# Patient Record
Sex: FEMALE | Race: BLACK OR AFRICAN AMERICAN | NOT HISPANIC OR LATINO
[De-identification: names, ages, dates, MRNs, and addresses within clinical notes are randomized per-mention and may not be internally consistent; named-entity substitution may affect disease eponyms.]

---

## 2018-04-03 ENCOUNTER — RECORDS - HEALTHEAST (OUTPATIENT)
Dept: ADMINISTRATIVE | Facility: OTHER | Age: 68
End: 2018-04-03

## 2018-06-01 ENCOUNTER — RECORDS - HEALTHEAST (OUTPATIENT)
Dept: ADMINISTRATIVE | Facility: OTHER | Age: 68
End: 2018-06-01

## 2019-04-03 ENCOUNTER — RECORDS - HEALTHEAST (OUTPATIENT)
Dept: ADMINISTRATIVE | Facility: OTHER | Age: 69
End: 2019-04-03

## 2019-04-04 ENCOUNTER — RECORDS - HEALTHEAST (OUTPATIENT)
Dept: ADMINISTRATIVE | Facility: OTHER | Age: 69
End: 2019-04-04

## 2019-04-10 ENCOUNTER — RECORDS - HEALTHEAST (OUTPATIENT)
Dept: ADMINISTRATIVE | Facility: OTHER | Age: 69
End: 2019-04-10

## 2019-04-12 ENCOUNTER — RECORDS - HEALTHEAST (OUTPATIENT)
Dept: ADMINISTRATIVE | Facility: OTHER | Age: 69
End: 2019-04-12

## 2019-06-16 ENCOUNTER — RECORDS - HEALTHEAST (OUTPATIENT)
Dept: ADMINISTRATIVE | Facility: OTHER | Age: 69
End: 2019-06-16

## 2019-07-22 ENCOUNTER — OFFICE VISIT - HEALTHEAST (OUTPATIENT)
Dept: INTERNAL MEDICINE | Facility: CLINIC | Age: 69
End: 2019-07-22

## 2019-07-22 DIAGNOSIS — I10 BENIGN ESSENTIAL HYPERTENSION: ICD-10-CM

## 2019-07-22 DIAGNOSIS — N18.30 CHRONIC KIDNEY DISEASE, STAGE 3 (MODERATE): ICD-10-CM

## 2019-07-22 DIAGNOSIS — G40.909 SEIZURE DISORDER (H): ICD-10-CM

## 2019-07-22 DIAGNOSIS — G47.30 SLEEP APNEA, UNSPECIFIED TYPE: ICD-10-CM

## 2019-07-22 DIAGNOSIS — I25.10 ATHEROSCLEROSIS OF NATIVE CORONARY ARTERY OF NATIVE HEART WITHOUT ANGINA PECTORIS: ICD-10-CM

## 2019-07-22 DIAGNOSIS — E78.5 HYPERLIPIDEMIA, UNSPECIFIED HYPERLIPIDEMIA TYPE: ICD-10-CM

## 2019-07-22 ASSESSMENT — MIFFLIN-ST. JEOR: SCORE: 1297.3

## 2019-08-26 ENCOUNTER — OFFICE VISIT - HEALTHEAST (OUTPATIENT)
Dept: INTERNAL MEDICINE | Facility: CLINIC | Age: 69
End: 2019-08-26

## 2019-08-26 DIAGNOSIS — I10 BENIGN ESSENTIAL HYPERTENSION: ICD-10-CM

## 2019-08-26 DIAGNOSIS — E55.9 VITAMIN D DEFICIENCY, UNSPECIFIED: ICD-10-CM

## 2019-08-26 DIAGNOSIS — N18.30 CHRONIC KIDNEY DISEASE, STAGE 3 (MODERATE): ICD-10-CM

## 2019-08-26 DIAGNOSIS — G47.30 SLEEP APNEA, UNSPECIFIED TYPE: ICD-10-CM

## 2019-08-26 DIAGNOSIS — I25.10 ATHEROSCLEROSIS OF NATIVE CORONARY ARTERY OF NATIVE HEART WITHOUT ANGINA PECTORIS: ICD-10-CM

## 2019-08-26 DIAGNOSIS — R42 VERTIGO: ICD-10-CM

## 2019-08-26 DIAGNOSIS — R25.2 LEG CRAMP: ICD-10-CM

## 2019-08-26 ASSESSMENT — MIFFLIN-ST. JEOR: SCORE: 1306.37

## 2019-08-28 LAB
ATRIAL RATE - MUSE: 62 BPM
DIASTOLIC BLOOD PRESSURE - MUSE: NORMAL MMHG
INTERPRETATION ECG - MUSE: NORMAL
P AXIS - MUSE: 54 DEGREES
PR INTERVAL - MUSE: 150 MS
QRS DURATION - MUSE: 84 MS
QT - MUSE: 478 MS
QTC - MUSE: 485 MS
R AXIS - MUSE: 2 DEGREES
SYSTOLIC BLOOD PRESSURE - MUSE: NORMAL MMHG
T AXIS - MUSE: 71 DEGREES
VENTRICULAR RATE- MUSE: 62 BPM

## 2019-09-25 ENCOUNTER — RECORDS - HEALTHEAST (OUTPATIENT)
Dept: ADMINISTRATIVE | Facility: OTHER | Age: 69
End: 2019-09-25

## 2019-10-02 ENCOUNTER — COMMUNICATION - HEALTHEAST (OUTPATIENT)
Dept: INTERNAL MEDICINE | Facility: CLINIC | Age: 69
End: 2019-10-02

## 2019-10-02 ENCOUNTER — AMBULATORY - HEALTHEAST (OUTPATIENT)
Dept: CARDIOLOGY | Facility: CLINIC | Age: 69
End: 2019-10-02

## 2019-10-02 ENCOUNTER — OFFICE VISIT - HEALTHEAST (OUTPATIENT)
Dept: CARDIOLOGY | Facility: CLINIC | Age: 69
End: 2019-10-02

## 2019-10-02 ENCOUNTER — RECORDS - HEALTHEAST (OUTPATIENT)
Dept: ADMINISTRATIVE | Facility: OTHER | Age: 69
End: 2019-10-02

## 2019-10-02 DIAGNOSIS — R55 SYNCOPE: ICD-10-CM

## 2019-10-02 ASSESSMENT — MIFFLIN-ST. JEOR: SCORE: 1313.63

## 2019-10-03 LAB
ATRIAL RATE - MUSE: 66 BPM
DIASTOLIC BLOOD PRESSURE - MUSE: NORMAL
INTERPRETATION ECG - MUSE: NORMAL
P AXIS - MUSE: 40 DEGREES
PR INTERVAL - MUSE: 136 MS
QRS DURATION - MUSE: 78 MS
QT - MUSE: 468 MS
QTC - MUSE: 490 MS
R AXIS - MUSE: -17 DEGREES
SYSTOLIC BLOOD PRESSURE - MUSE: NORMAL
T AXIS - MUSE: 50 DEGREES
VENTRICULAR RATE- MUSE: 66 BPM

## 2019-10-04 ENCOUNTER — AMBULATORY - HEALTHEAST (OUTPATIENT)
Dept: CARDIOLOGY | Facility: CLINIC | Age: 69
End: 2019-10-04

## 2019-10-04 ENCOUNTER — HOME CARE/HOSPICE - HEALTHEAST (OUTPATIENT)
Dept: HOME HEALTH SERVICES | Facility: HOME HEALTH | Age: 69
End: 2019-10-04

## 2019-10-07 ENCOUNTER — COMMUNICATION - HEALTHEAST (OUTPATIENT)
Dept: INTERNAL MEDICINE | Facility: CLINIC | Age: 69
End: 2019-10-07

## 2019-10-08 ENCOUNTER — COMMUNICATION - HEALTHEAST (OUTPATIENT)
Dept: CARE COORDINATION | Facility: CLINIC | Age: 69
End: 2019-10-08

## 2019-10-09 ENCOUNTER — COMMUNICATION - HEALTHEAST (OUTPATIENT)
Dept: INTERNAL MEDICINE | Facility: CLINIC | Age: 69
End: 2019-10-09

## 2019-10-10 ENCOUNTER — COMMUNICATION - HEALTHEAST (OUTPATIENT)
Dept: CARE COORDINATION | Facility: CLINIC | Age: 69
End: 2019-10-10

## 2019-10-10 ENCOUNTER — COMMUNICATION - HEALTHEAST (OUTPATIENT)
Dept: INTERNAL MEDICINE | Facility: CLINIC | Age: 69
End: 2019-10-10

## 2019-10-10 ENCOUNTER — AMBULATORY - HEALTHEAST (OUTPATIENT)
Dept: INTERNAL MEDICINE | Facility: CLINIC | Age: 69
End: 2019-10-10

## 2019-10-10 DIAGNOSIS — I95.1 ORTHOSTATIC HYPOTENSION: ICD-10-CM

## 2019-10-10 DIAGNOSIS — R55 SYNCOPE AND COLLAPSE: ICD-10-CM

## 2019-10-15 ENCOUNTER — RECORDS - HEALTHEAST (OUTPATIENT)
Dept: ADMINISTRATIVE | Facility: OTHER | Age: 69
End: 2019-10-15

## 2019-10-15 ENCOUNTER — COMMUNICATION - HEALTHEAST (OUTPATIENT)
Dept: INTERNAL MEDICINE | Facility: CLINIC | Age: 69
End: 2019-10-15

## 2019-10-16 ENCOUNTER — RECORDS - HEALTHEAST (OUTPATIENT)
Dept: ADMINISTRATIVE | Facility: OTHER | Age: 69
End: 2019-10-16

## 2019-10-17 ENCOUNTER — AMBULATORY - HEALTHEAST (OUTPATIENT)
Dept: LAB | Facility: CLINIC | Age: 69
End: 2019-10-17

## 2019-10-17 DIAGNOSIS — I25.10 ATHEROSCLEROSIS OF NATIVE CORONARY ARTERY OF NATIVE HEART WITHOUT ANGINA PECTORIS: ICD-10-CM

## 2019-10-17 DIAGNOSIS — R25.2 LEG CRAMP: ICD-10-CM

## 2019-10-17 DIAGNOSIS — N18.30 CHRONIC KIDNEY DISEASE, STAGE 3 (MODERATE): ICD-10-CM

## 2019-10-17 DIAGNOSIS — E55.9 VITAMIN D DEFICIENCY, UNSPECIFIED: ICD-10-CM

## 2019-10-17 LAB
25(OH)D3 SERPL-MCNC: 40.1 NG/ML (ref 30–80)
ALBUMIN SERPL-MCNC: 3.9 G/DL (ref 3.5–5)
ALP SERPL-CCNC: 83 U/L (ref 45–120)
ALT SERPL W P-5'-P-CCNC: 15 U/L (ref 0–45)
ANION GAP SERPL CALCULATED.3IONS-SCNC: 7 MMOL/L (ref 5–18)
AST SERPL W P-5'-P-CCNC: 16 U/L (ref 0–40)
BILIRUB SERPL-MCNC: 0.3 MG/DL (ref 0–1)
BUN SERPL-MCNC: 13 MG/DL (ref 8–28)
CALCIUM SERPL-MCNC: 9.6 MG/DL (ref 8.5–10.5)
CHLORIDE BLD-SCNC: 110 MMOL/L (ref 98–107)
CHOLEST SERPL-MCNC: 155 MG/DL
CO2 SERPL-SCNC: 24 MMOL/L (ref 22–31)
CREAT SERPL-MCNC: 1.01 MG/DL (ref 0.6–1.1)
ERYTHROCYTE [DISTWIDTH] IN BLOOD BY AUTOMATED COUNT: 12.2 % (ref 11–14.5)
FASTING STATUS PATIENT QL REPORTED: YES
GFR SERPL CREATININE-BSD FRML MDRD: 54 ML/MIN/1.73M2
GLUCOSE BLD-MCNC: 91 MG/DL (ref 70–125)
HCT VFR BLD AUTO: 41.1 % (ref 35–47)
HDLC SERPL-MCNC: 52 MG/DL
HGB BLD-MCNC: 13.3 G/DL (ref 12–16)
LDLC SERPL CALC-MCNC: 88 MG/DL
MCH RBC QN AUTO: 32 PG (ref 27–34)
MCHC RBC AUTO-ENTMCNC: 32.4 G/DL (ref 32–36)
MCV RBC AUTO: 99 FL (ref 80–100)
PLATELET # BLD AUTO: 254 THOU/UL (ref 140–440)
PMV BLD AUTO: 11.2 FL (ref 8.5–12.5)
POTASSIUM BLD-SCNC: 4.4 MMOL/L (ref 3.5–5)
PROT SERPL-MCNC: 6.8 G/DL (ref 6–8)
RBC # BLD AUTO: 4.15 MILL/UL (ref 3.8–5.4)
SODIUM SERPL-SCNC: 141 MMOL/L (ref 136–145)
TRIGL SERPL-MCNC: 77 MG/DL
TSH SERPL DL<=0.005 MIU/L-ACNC: 1 UIU/ML (ref 0.3–5)
VIT B12 SERPL-MCNC: 238 PG/ML (ref 213–816)
WBC: 8.3 THOU/UL (ref 4–11)

## 2019-10-23 ENCOUNTER — COMMUNICATION - HEALTHEAST (OUTPATIENT)
Dept: INTERNAL MEDICINE | Facility: CLINIC | Age: 69
End: 2019-10-23

## 2019-10-25 ENCOUNTER — OFFICE VISIT - HEALTHEAST (OUTPATIENT)
Dept: INTERNAL MEDICINE | Facility: CLINIC | Age: 69
End: 2019-10-25

## 2019-10-25 ENCOUNTER — COMMUNICATION - HEALTHEAST (OUTPATIENT)
Dept: INTERNAL MEDICINE | Facility: CLINIC | Age: 69
End: 2019-10-25

## 2019-10-25 DIAGNOSIS — I10 ESSENTIAL HYPERTENSION: ICD-10-CM

## 2019-10-25 DIAGNOSIS — E55.9 VITAMIN D DEFICIENCY: ICD-10-CM

## 2019-10-25 DIAGNOSIS — G40.909 NONINTRACTABLE EPILEPSY WITHOUT STATUS EPILEPTICUS, UNSPECIFIED EPILEPSY TYPE (H): ICD-10-CM

## 2019-10-25 DIAGNOSIS — I25.119 CORONARY ARTERY DISEASE INVOLVING NATIVE CORONARY ARTERY OF NATIVE HEART WITH ANGINA PECTORIS (H): ICD-10-CM

## 2019-10-25 DIAGNOSIS — E53.8 VITAMIN B12 DEFICIENCY (NON ANEMIC): ICD-10-CM

## 2019-10-25 ASSESSMENT — MIFFLIN-ST. JEOR: SCORE: 1320.55

## 2019-10-29 ENCOUNTER — COMMUNICATION - HEALTHEAST (OUTPATIENT)
Dept: SCHEDULING | Facility: CLINIC | Age: 69
End: 2019-10-29

## 2019-10-31 ENCOUNTER — COMMUNICATION - HEALTHEAST (OUTPATIENT)
Dept: INTERNAL MEDICINE | Facility: CLINIC | Age: 69
End: 2019-10-31

## 2019-10-31 ENCOUNTER — RECORDS - HEALTHEAST (OUTPATIENT)
Dept: ADMINISTRATIVE | Facility: OTHER | Age: 69
End: 2019-10-31

## 2019-11-07 ENCOUNTER — COMMUNICATION - HEALTHEAST (OUTPATIENT)
Dept: INTERNAL MEDICINE | Facility: CLINIC | Age: 69
End: 2019-11-07

## 2019-11-07 DIAGNOSIS — G40.909 NONINTRACTABLE EPILEPSY WITHOUT STATUS EPILEPTICUS, UNSPECIFIED EPILEPSY TYPE (H): ICD-10-CM

## 2019-11-07 DIAGNOSIS — I25.119 CORONARY ARTERY DISEASE INVOLVING NATIVE CORONARY ARTERY OF NATIVE HEART WITH ANGINA PECTORIS (H): ICD-10-CM

## 2019-11-07 DIAGNOSIS — J45.20 INTERMITTENT ASTHMA WITHOUT COMPLICATION, UNSPECIFIED ASTHMA SEVERITY: ICD-10-CM

## 2019-11-07 DIAGNOSIS — E53.8 VITAMIN B12 DEFICIENCY (NON ANEMIC): ICD-10-CM

## 2019-11-07 DIAGNOSIS — E55.9 VITAMIN D DEFICIENCY: ICD-10-CM

## 2019-11-07 DIAGNOSIS — I10 ESSENTIAL HYPERTENSION: ICD-10-CM

## 2019-11-08 ENCOUNTER — RECORDS - HEALTHEAST (OUTPATIENT)
Dept: ADMINISTRATIVE | Facility: OTHER | Age: 69
End: 2019-11-08

## 2019-11-11 ENCOUNTER — COMMUNICATION - HEALTHEAST (OUTPATIENT)
Dept: INTERNAL MEDICINE | Facility: CLINIC | Age: 69
End: 2019-11-11

## 2019-11-11 DIAGNOSIS — I25.10 CORONARY ARTERY DISEASE INVOLVING NATIVE HEART WITHOUT ANGINA PECTORIS, UNSPECIFIED VESSEL OR LESION TYPE: ICD-10-CM

## 2019-11-14 ENCOUNTER — COMMUNICATION - HEALTHEAST (OUTPATIENT)
Dept: INTERNAL MEDICINE | Facility: CLINIC | Age: 69
End: 2019-11-14

## 2019-11-14 ENCOUNTER — RECORDS - HEALTHEAST (OUTPATIENT)
Dept: INTERNAL MEDICINE | Facility: CLINIC | Age: 69
End: 2019-11-14

## 2019-11-14 ENCOUNTER — RECORDS - HEALTHEAST (OUTPATIENT)
Dept: ADMINISTRATIVE | Facility: OTHER | Age: 69
End: 2019-11-14

## 2019-11-15 ENCOUNTER — OFFICE VISIT - HEALTHEAST (OUTPATIENT)
Dept: CARDIOLOGY | Facility: CLINIC | Age: 69
End: 2019-11-15

## 2019-11-15 ENCOUNTER — COMMUNICATION - HEALTHEAST (OUTPATIENT)
Dept: INTERNAL MEDICINE | Facility: CLINIC | Age: 69
End: 2019-11-15

## 2019-11-15 ENCOUNTER — COMMUNICATION - HEALTHEAST (OUTPATIENT)
Dept: CARDIOLOGY | Facility: CLINIC | Age: 69
End: 2019-11-15

## 2019-11-15 DIAGNOSIS — R42 DIZZINESS: ICD-10-CM

## 2019-11-15 DIAGNOSIS — I25.119 CORONARY ARTERY DISEASE INVOLVING NATIVE CORONARY ARTERY OF NATIVE HEART WITH ANGINA PECTORIS (H): ICD-10-CM

## 2019-11-15 DIAGNOSIS — E78.5 DYSLIPIDEMIA, GOAL LDL BELOW 70: ICD-10-CM

## 2019-11-15 DIAGNOSIS — I10 ESSENTIAL HYPERTENSION: ICD-10-CM

## 2019-11-15 ASSESSMENT — MIFFLIN-ST. JEOR: SCORE: 1324.52

## 2019-11-18 ENCOUNTER — HOSPITAL ENCOUNTER (OUTPATIENT)
Dept: CARDIOLOGY | Facility: CLINIC | Age: 69
Discharge: HOME | End: 2019-11-18
Attending: INTERNAL MEDICINE

## 2019-11-18 ENCOUNTER — OFFICE VISIT - HEALTHEAST (OUTPATIENT)
Dept: INTERNAL MEDICINE | Facility: CLINIC | Age: 69
End: 2019-11-18

## 2019-11-18 DIAGNOSIS — G47.30 SLEEP APNEA, UNSPECIFIED TYPE: ICD-10-CM

## 2019-11-18 DIAGNOSIS — I10 ESSENTIAL HYPERTENSION: ICD-10-CM

## 2019-11-18 DIAGNOSIS — E78.5 DYSLIPIDEMIA, GOAL LDL BELOW 70: ICD-10-CM

## 2019-11-18 DIAGNOSIS — G40.909 NONINTRACTABLE EPILEPSY WITHOUT STATUS EPILEPTICUS, UNSPECIFIED EPILEPSY TYPE (H): ICD-10-CM

## 2019-11-18 DIAGNOSIS — R42 DIZZINESS: ICD-10-CM

## 2019-11-18 ASSESSMENT — MIFFLIN-ST. JEOR: SCORE: 1347.2

## 2019-11-19 ENCOUNTER — COMMUNICATION - HEALTHEAST (OUTPATIENT)
Dept: CARDIOLOGY | Facility: CLINIC | Age: 69
End: 2019-11-19

## 2019-11-21 ENCOUNTER — COMMUNICATION - HEALTHEAST (OUTPATIENT)
Dept: CARDIOLOGY | Facility: CLINIC | Age: 69
End: 2019-11-21

## 2019-11-21 ENCOUNTER — COMMUNICATION - HEALTHEAST (OUTPATIENT)
Dept: INTERNAL MEDICINE | Facility: CLINIC | Age: 69
End: 2019-11-21

## 2019-11-21 DIAGNOSIS — I25.10 CORONARY ARTERY DISEASE INVOLVING NATIVE HEART WITHOUT ANGINA PECTORIS, UNSPECIFIED VESSEL OR LESION TYPE: ICD-10-CM

## 2019-11-26 ENCOUNTER — COMMUNICATION - HEALTHEAST (OUTPATIENT)
Dept: CARDIOLOGY | Facility: CLINIC | Age: 69
End: 2019-11-26

## 2019-11-29 ENCOUNTER — COMMUNICATION - HEALTHEAST (OUTPATIENT)
Dept: CARDIOLOGY | Facility: CLINIC | Age: 69
End: 2019-11-29

## 2019-12-03 ENCOUNTER — COMMUNICATION - HEALTHEAST (OUTPATIENT)
Dept: CARDIOLOGY | Facility: CLINIC | Age: 69
End: 2019-12-03

## 2019-12-04 ENCOUNTER — RECORDS - HEALTHEAST (OUTPATIENT)
Dept: ADMINISTRATIVE | Facility: OTHER | Age: 69
End: 2019-12-04

## 2019-12-12 ENCOUNTER — COMMUNICATION - HEALTHEAST (OUTPATIENT)
Dept: INTERNAL MEDICINE | Facility: CLINIC | Age: 69
End: 2019-12-12

## 2020-01-13 ENCOUNTER — OFFICE VISIT - HEALTHEAST (OUTPATIENT)
Dept: INTERNAL MEDICINE | Facility: CLINIC | Age: 70
End: 2020-01-13

## 2020-01-13 DIAGNOSIS — R42 DIZZINESS: ICD-10-CM

## 2020-01-13 DIAGNOSIS — I10 ESSENTIAL HYPERTENSION: ICD-10-CM

## 2020-01-13 DIAGNOSIS — Z12.11 SCREEN FOR COLON CANCER: ICD-10-CM

## 2020-01-13 DIAGNOSIS — G47.33 OBSTRUCTIVE SLEEP APNEA SYNDROME: ICD-10-CM

## 2020-01-13 ASSESSMENT — MIFFLIN-ST. JEOR: SCORE: 1333.59

## 2020-01-20 ENCOUNTER — HOSPITAL ENCOUNTER (OUTPATIENT)
Dept: NEUROLOGY | Facility: HOSPITAL | Age: 70
Discharge: HOME | End: 2020-01-20
Attending: PSYCHIATRY & NEUROLOGY

## 2020-01-20 DIAGNOSIS — G40.209 COMPLEX PARTIAL SEIZURE (H): ICD-10-CM

## 2020-01-20 DIAGNOSIS — R42 VERTIGO: ICD-10-CM

## 2020-01-30 ENCOUNTER — COMMUNICATION - HEALTHEAST (OUTPATIENT)
Dept: SCHEDULING | Facility: CLINIC | Age: 70
End: 2020-01-30

## 2020-02-06 ENCOUNTER — OFFICE VISIT - HEALTHEAST (OUTPATIENT)
Dept: INTERNAL MEDICINE | Facility: CLINIC | Age: 70
End: 2020-02-06

## 2020-02-06 DIAGNOSIS — I10 ESSENTIAL HYPERTENSION: ICD-10-CM

## 2020-02-06 DIAGNOSIS — E53.8 VITAMIN B12 DEFICIENCY (NON ANEMIC): ICD-10-CM

## 2020-02-06 DIAGNOSIS — E55.9 VITAMIN D DEFICIENCY: ICD-10-CM

## 2020-02-06 DIAGNOSIS — R25.2 MUSCLE CRAMP: ICD-10-CM

## 2020-02-06 LAB
ALBUMIN SERPL-MCNC: 4.1 G/DL (ref 3.5–5)
ALP SERPL-CCNC: 78 U/L (ref 45–120)
ALT SERPL W P-5'-P-CCNC: 18 U/L (ref 0–45)
ANION GAP SERPL CALCULATED.3IONS-SCNC: 13 MMOL/L (ref 5–18)
AST SERPL W P-5'-P-CCNC: 18 U/L (ref 0–40)
BILIRUB SERPL-MCNC: 0.5 MG/DL (ref 0–1)
BUN SERPL-MCNC: 24 MG/DL (ref 8–28)
CALCIUM SERPL-MCNC: 9.5 MG/DL (ref 8.5–10.5)
CHLORIDE BLD-SCNC: 104 MMOL/L (ref 98–107)
CK SERPL-CCNC: 159 U/L (ref 30–190)
CO2 SERPL-SCNC: 23 MMOL/L (ref 22–31)
CREAT SERPL-MCNC: 1.42 MG/DL (ref 0.6–1.1)
ERYTHROCYTE [DISTWIDTH] IN BLOOD BY AUTOMATED COUNT: 10.5 % (ref 11–14.5)
ERYTHROCYTE [SEDIMENTATION RATE] IN BLOOD BY WESTERGREN METHOD: 12 MM/HR (ref 0–20)
GFR SERPL CREATININE-BSD FRML MDRD: 36 ML/MIN/1.73M2
GLUCOSE BLD-MCNC: 166 MG/DL (ref 70–125)
HCT VFR BLD AUTO: 40.9 % (ref 35–47)
HGB BLD-MCNC: 13.9 G/DL (ref 12–16)
MCH RBC QN AUTO: 33 PG (ref 27–34)
MCHC RBC AUTO-ENTMCNC: 34 G/DL (ref 32–36)
MCV RBC AUTO: 97 FL (ref 80–100)
PLATELET # BLD AUTO: 271 THOU/UL (ref 140–440)
PMV BLD AUTO: 9.1 FL (ref 7–10)
POTASSIUM BLD-SCNC: 3.8 MMOL/L (ref 3.5–5)
PROT SERPL-MCNC: 7.3 G/DL (ref 6–8)
RBC # BLD AUTO: 4.21 MILL/UL (ref 3.8–5.4)
SODIUM SERPL-SCNC: 140 MMOL/L (ref 136–145)
VIT B12 SERPL-MCNC: 278 PG/ML (ref 213–816)
WBC: 9.5 THOU/UL (ref 4–11)

## 2020-02-06 ASSESSMENT — MIFFLIN-ST. JEOR: SCORE: 1329.42

## 2020-02-07 ENCOUNTER — COMMUNICATION - HEALTHEAST (OUTPATIENT)
Dept: INTERNAL MEDICINE | Facility: CLINIC | Age: 70
End: 2020-02-07

## 2020-02-07 LAB — 25(OH)D3 SERPL-MCNC: 43.2 NG/ML (ref 30–80)

## 2020-04-17 ENCOUNTER — OFFICE VISIT - HEALTHEAST (OUTPATIENT)
Dept: INTERNAL MEDICINE | Facility: CLINIC | Age: 70
End: 2020-04-17

## 2020-04-17 DIAGNOSIS — L98.9 SKIN LESION: ICD-10-CM

## 2020-04-28 ENCOUNTER — OFFICE VISIT - HEALTHEAST (OUTPATIENT)
Dept: CARDIOLOGY | Facility: CLINIC | Age: 70
End: 2020-04-28

## 2020-04-28 DIAGNOSIS — G47.33 OBSTRUCTIVE SLEEP APNEA SYNDROME: ICD-10-CM

## 2020-04-28 DIAGNOSIS — E78.5 DYSLIPIDEMIA, GOAL LDL BELOW 70: ICD-10-CM

## 2020-04-28 DIAGNOSIS — I10 ESSENTIAL HYPERTENSION: ICD-10-CM

## 2020-04-28 DIAGNOSIS — I95.1 ORTHOSTATIC HYPOTENSION: ICD-10-CM

## 2020-04-28 DIAGNOSIS — I25.119 CORONARY ARTERY DISEASE INVOLVING NATIVE CORONARY ARTERY OF NATIVE HEART WITH ANGINA PECTORIS (H): ICD-10-CM

## 2020-10-07 ENCOUNTER — COMMUNICATION - HEALTHEAST (OUTPATIENT)
Dept: SCHEDULING | Facility: CLINIC | Age: 70
End: 2020-10-07

## 2020-10-08 ENCOUNTER — OFFICE VISIT - HEALTHEAST (OUTPATIENT)
Dept: INTERNAL MEDICINE | Facility: CLINIC | Age: 70
End: 2020-10-08

## 2020-10-08 DIAGNOSIS — G47.33 OBSTRUCTIVE SLEEP APNEA SYNDROME: ICD-10-CM

## 2020-10-08 DIAGNOSIS — M54.50 ACUTE MIDLINE LOW BACK PAIN WITHOUT SCIATICA: ICD-10-CM

## 2020-10-08 ASSESSMENT — MIFFLIN-ST. JEOR: SCORE: 1279.16

## 2020-11-17 ENCOUNTER — OFFICE VISIT - HEALTHEAST (OUTPATIENT)
Dept: INTERNAL MEDICINE | Facility: CLINIC | Age: 70
End: 2020-11-17

## 2020-11-17 DIAGNOSIS — I25.119 CORONARY ARTERY DISEASE INVOLVING NATIVE CORONARY ARTERY OF NATIVE HEART WITH ANGINA PECTORIS (H): ICD-10-CM

## 2020-11-17 DIAGNOSIS — Z23 NEEDS FLU SHOT: ICD-10-CM

## 2020-11-17 DIAGNOSIS — Z11.59 NEED FOR HEPATITIS C SCREENING TEST: ICD-10-CM

## 2020-11-17 DIAGNOSIS — E53.8 VITAMIN B12 DEFICIENCY (NON ANEMIC): ICD-10-CM

## 2020-11-17 DIAGNOSIS — Z12.11 SCREEN FOR COLON CANCER: ICD-10-CM

## 2020-11-17 DIAGNOSIS — I10 ESSENTIAL HYPERTENSION: ICD-10-CM

## 2020-11-17 DIAGNOSIS — E55.9 VITAMIN D DEFICIENCY: ICD-10-CM

## 2020-11-17 DIAGNOSIS — Z00.00 WELLNESS EXAMINATION: ICD-10-CM

## 2020-11-17 LAB
ALBUMIN SERPL-MCNC: 4.3 G/DL (ref 3.5–5)
ALP SERPL-CCNC: 91 U/L (ref 45–120)
ALT SERPL W P-5'-P-CCNC: 12 U/L (ref 0–45)
ANION GAP SERPL CALCULATED.3IONS-SCNC: 13 MMOL/L (ref 5–18)
AST SERPL W P-5'-P-CCNC: 16 U/L (ref 0–40)
BILIRUB SERPL-MCNC: 0.6 MG/DL (ref 0–1)
BUN SERPL-MCNC: 16 MG/DL (ref 8–28)
CALCIUM SERPL-MCNC: 10.2 MG/DL (ref 8.5–10.5)
CHLORIDE BLD-SCNC: 106 MMOL/L (ref 98–107)
CHOLEST SERPL-MCNC: 158 MG/DL
CO2 SERPL-SCNC: 24 MMOL/L (ref 22–31)
CREAT SERPL-MCNC: 1.38 MG/DL (ref 0.6–1.1)
ERYTHROCYTE [DISTWIDTH] IN BLOOD BY AUTOMATED COUNT: 11.2 % (ref 11–14.5)
FASTING STATUS PATIENT QL REPORTED: YES
GFR SERPL CREATININE-BSD FRML MDRD: 38 ML/MIN/1.73M2
GLUCOSE BLD-MCNC: 109 MG/DL (ref 70–125)
HCT VFR BLD AUTO: 42.9 % (ref 35–47)
HDLC SERPL-MCNC: 45 MG/DL
HGB BLD-MCNC: 13.9 G/DL (ref 12–16)
LDLC SERPL CALC-MCNC: 95 MG/DL
MCH RBC QN AUTO: 31.1 PG (ref 27–34)
MCHC RBC AUTO-ENTMCNC: 32.4 G/DL (ref 32–36)
MCV RBC AUTO: 96 FL (ref 80–100)
PLATELET # BLD AUTO: 297 THOU/UL (ref 140–440)
PMV BLD AUTO: 8.7 FL (ref 7–10)
POTASSIUM BLD-SCNC: 3.7 MMOL/L (ref 3.5–5)
PROT SERPL-MCNC: 7 G/DL (ref 6–8)
RBC # BLD AUTO: 4.46 MILL/UL (ref 3.8–5.4)
SODIUM SERPL-SCNC: 143 MMOL/L (ref 136–145)
TRIGL SERPL-MCNC: 90 MG/DL
TSH SERPL DL<=0.005 MIU/L-ACNC: 0.95 UIU/ML (ref 0.3–5)
VIT B12 SERPL-MCNC: 347 PG/ML (ref 213–816)
WBC: 8.1 THOU/UL (ref 4–11)

## 2020-11-17 ASSESSMENT — MIFFLIN-ST. JEOR: SCORE: 1198.08

## 2020-11-18 ENCOUNTER — COMMUNICATION - HEALTHEAST (OUTPATIENT)
Dept: INTERNAL MEDICINE | Facility: CLINIC | Age: 70
End: 2020-11-18

## 2020-11-18 ENCOUNTER — COMMUNICATION - HEALTHEAST (OUTPATIENT)
Dept: CARDIOLOGY | Facility: CLINIC | Age: 70
End: 2020-11-18

## 2020-11-18 LAB
25(OH)D3 SERPL-MCNC: 69.8 NG/ML (ref 30–80)
HCV AB SERPL QL IA: NEGATIVE

## 2020-11-19 ENCOUNTER — OFFICE VISIT - HEALTHEAST (OUTPATIENT)
Dept: CARDIOLOGY | Facility: CLINIC | Age: 70
End: 2020-11-19

## 2020-11-19 DIAGNOSIS — I25.119 CORONARY ARTERY DISEASE INVOLVING NATIVE CORONARY ARTERY OF NATIVE HEART WITH ANGINA PECTORIS (H): ICD-10-CM

## 2020-11-19 DIAGNOSIS — E78.5 DYSLIPIDEMIA, GOAL LDL BELOW 70: ICD-10-CM

## 2020-11-19 DIAGNOSIS — I95.1 ORTHOSTATIC HYPOTENSION: ICD-10-CM

## 2020-11-19 DIAGNOSIS — I10 ESSENTIAL HYPERTENSION: ICD-10-CM

## 2020-11-30 ENCOUNTER — COMMUNICATION - HEALTHEAST (OUTPATIENT)
Dept: CARDIOLOGY | Facility: CLINIC | Age: 70
End: 2020-11-30

## 2020-12-10 ENCOUNTER — COMMUNICATION - HEALTHEAST (OUTPATIENT)
Dept: CARDIOLOGY | Facility: CLINIC | Age: 70
End: 2020-12-10

## 2021-02-15 ENCOUNTER — COMMUNICATION - HEALTHEAST (OUTPATIENT)
Dept: SCHEDULING | Facility: CLINIC | Age: 71
End: 2021-02-15

## 2021-02-15 DIAGNOSIS — I25.10 CORONARY ARTERY DISEASE INVOLVING NATIVE HEART WITHOUT ANGINA PECTORIS, UNSPECIFIED VESSEL OR LESION TYPE: ICD-10-CM

## 2021-02-19 ENCOUNTER — COMMUNICATION - HEALTHEAST (OUTPATIENT)
Dept: SCHEDULING | Facility: CLINIC | Age: 71
End: 2021-02-19

## 2021-02-19 ENCOUNTER — COMMUNICATION - HEALTHEAST (OUTPATIENT)
Dept: INTERNAL MEDICINE | Facility: CLINIC | Age: 71
End: 2021-02-19

## 2021-05-27 VITALS — DIASTOLIC BLOOD PRESSURE: 59 MMHG | SYSTOLIC BLOOD PRESSURE: 128 MMHG

## 2021-05-28 ASSESSMENT — ASTHMA QUESTIONNAIRES
ACT_TOTALSCORE: 17
ACT_TOTALSCORE: 21
ACT_TOTALSCORE: 15
ACT_TOTALSCORE: 15

## 2021-05-30 NOTE — PROGRESS NOTES
Office Visit   Shira Omalley   68 y.o. female    Date of Visit: 7/22/2019    Chief Complaint   Patient presents with     [facility] visit     Vertigo and falling        Assessment and Plan   1. Atherosclerosis of native coronary artery of native heart without angina pectoris  She is not real clear on her medications but does believe she is taking Plavix and aspirin.  States she is only on amlodipine.  She was recently evaluated in [place] and just moved here.  I think it would be beneficial to get her outside records before doing anything else.  We will have her come back and see me in a few weeks so that we can evaluate her outside records and determine next steps.  She is in agreement with this plan.  - clopidogrel (PLAVIX) 75 mg tablet; Take 1 tablet (75 mg total) by mouth daily.  Dispense: 90 tablet; Refill: 3  - aspirin 81 MG EC tablet; Take 1 tablet (81 mg total) by mouth daily.  Dispense: 90 tablet; Refill: 0    2. Benign essential hypertension  Her blood pressure is satisfactory today.  Again she thinks that she is just taking amlodipine.    3. Chronic kidney disease, stage 3 (moderate) (H)  We will get her old records to determine next steps.    4. Hyperlipidemia, unspecified hyperlipidemia type  She is not taking her atorvastatin at this time.    5. Sleep apnea, unspecified type  Right now she does not have her sleep apnea machine.  May need to get her in with the sleep clinic.  We will see her back to reassess.       Return in about 4 weeks (around 8/19/2019) for Recheck - multiple med problems - 40 minutes.     History of Present Illness   This 68 y.o. old female comes in to [facility].  She recently moved from [facility] to [place].  States she lived here until 2009.  She has a complicated medical history with a history of seizure disorder, coronary artery disease, hypertension, kidney disease, breast cancer, uterine cancer, and possibly ovarian cancer.  I do not have her  "outside records but she was recently seen by her primary in [place].  Her only major concern is that she has ongoing vertigo but did recently have a significant evaluation.  Would like to get those records before doing any more testing.    Review of Systems: As above, systems otherwise reviewed and negative.     Medications, Allergies and Problem List   Patient Active Problem List   Diagnosis     Atherosclerotic heart disease of native coronary artery without angina pectoris     Chronic kidney disease, stage 3 (moderate) (H)     Hypercalcemia     Hyperlipidemia, unspecified     Hyperparathyroidism (H)     Personal history of other malignant neoplasm of bronchus and lung     Sleep apnea     Benign essential hypertension     HX: breast cancer     Cancer, uterine (H)     Seizure disorder (H)     Current Outpatient Medications   Medication Sig Dispense Refill     amLODIPine (NORVASC) 10 MG tablet        aspirin 81 MG EC tablet Take 1 tablet (81 mg total) by mouth daily. 90 tablet 0     nitroglycerin (NITROSTAT) 0.4 MG SL tablet        clopidogrel (PLAVIX) 75 mg tablet Take 1 tablet (75 mg total) by mouth daily. 90 tablet 3     No current facility-administered medications for this visit.      Allergies   Allergen Reactions     Contrast [Iohexol] Itching     Iodinated Casein      Iodinated Contrast- Oral And Iv Dye Itching     Premedicated prior to CT scans and has no problems     Penicillins           Physical Exam     /90   Pulse 80   Ht 5' 4.25\" (1.632 m)   Wt 176 lb (79.8 kg)   SpO2 98%   BMI 29.98 kg/m      General: This is an alert female in no apparent distress.     Additional Information   Social History     Tobacco Use     Smoking status: Former Smoker     Smokeless tobacco: Never Used   Substance Use Topics     Alcohol use: Not on file     Drug use: Not on file          Nguyen Jordan MD    "

## 2021-05-31 NOTE — PROGRESS NOTES
Office Visit   Shira Omalley   68 y.o. female    Date of Visit: 8/26/2019    Chief Complaint   Patient presents with     Follow-up     4 week follow up- vertigo        Assessment and Plan   1. Atherosclerosis of native coronary artery of native heart without angina pectoris  I do have some records from her cardiologist in [place] but unfortunately do not have any notes from her hospitalization in May.  She states that at that time they took her off pretty much all of her medications.  They only left her on amlodipine and clopidogrel.  Previously she had been on losartan, atorvastatin, and carvedilol but is not currently taking any of these medications.  She states she does occasionally have chest pain and will take a nitroglycerin.  I am going to refer her to cardiology, set up a recheck of her labs, get an EKG, and try again to get her records from the hospitalization in May when most of her medications were discontinued.  She is in agreement with this plan.  - clopidogrel (PLAVIX) 75 mg tablet; Take 1 tablet (75 mg total) by mouth daily.  Dispense: 90 tablet; Refill: 3  - amLODIPine (NORVASC) 10 MG tablet; Take 1 tablet (10 mg total) by mouth daily.  Dispense: 90 tablet; Refill: 3  - Lipid Cascade; Future  - Electrocardiogram Perform - Clinic; Future  - Ambulatory referral to Cardiology  - Electrocardiogram Perform and Read    2. Chronic kidney disease, stage 3 (moderate) (H)  She notes a history of kidney disease.  She was seeing a nephrologist in [place].  Will get a recheck of her labs and refer her to nephrology.  - HM2(CBC w/o Differential); Future  - Comprehensive Metabolic Panel; Future  - Vitamin D, Total (25-Hydroxy); Future  - Ambulatory referral to Nephrology    3. Vertigo  She has been having significant symptoms of vertigo.  She also notes some symptoms of visual hallucination or distortion.  She thinks that she had some sort of brain imaging in May and I will again try to get those  records.  If the symptoms continue then we may need to get an MRI but it seems that she is probably already had one.  I think that that would be very helpful.    4. Benign essential hypertension  Blood pressure is satisfactory on the amlodipine.    5. Leg cramp  She notes increased symptoms of leg cramping.  We will get her electrolytes and thyroid function as well as B12.  She also notes she has been low in vitamin D and we will recheck that as well.  - Vitamin D, Total (25-Hydroxy); Future  - Vitamin B12; Future  - Thyroid Cascade; Future    6. Vitamin D deficiency, unspecified  - Vitamin D, Total (25-Hydroxy); Future    7. Sleep apnea, unspecified type  She has a history of sleep apnea but is not on a CPAP at this time.  She like to have this reevaluated.  - Ambulatory referral to Sleep Medicine       Return in about 1 month (around 9/26/2019) for Annual physical.     History of Present Illness   This 68 y.o. old comes in to follow-up.  I saw her for the first time about a month ago and she was unclear about her medications.  Since then we have received some of her outside records.  I have notes from her primary physician as well as her cardiologist in [facility].  I have recent blood work as well as her most recent echocardiogram which was in 2017.  Her last clinic visit with her cardiologist in March 2019 states that she was stable.  She notes however that she was hospitalized in May due to significant dizziness and falls.  During that hospitalization she states that many of her medications were discontinued including carvedilol, losartan, and atorvastatin.  She states that she now is only on clopidogrel and amlodipine.  Unfortunately I do not have the records from that hospitalizations.  She states that she continues to have vertigo symptoms.  She is off balance.  Her  on her left hand is not as strong as its been.  She has visual symptoms that almost seem like hallucination at times.  She feels  "that she did have an MRI or head CT when she was in the hospital.  Her symptoms have not worsened but discontinue.  She also notes that on occasion she will get chest discomfort.  It does not last long.  Is not related to exertion.  She will take nitroglycerin.  She has no other acute concerns today.    Review of Systems: As above, systems otherwise reviewed and negative.     Medications, Allergies and Problem List   Patient Active Problem List   Diagnosis     Atherosclerotic heart disease of native coronary artery without angina pectoris     Chronic kidney disease, stage 3 (moderate) (H)     Hypercalcemia     Hyperlipidemia, unspecified     Hyperparathyroidism (H)     Personal history of other malignant neoplasm of bronchus and lung     Sleep apnea     Benign essential hypertension     HX: breast cancer     Cancer, uterine (H)     Seizure disorder (H)     History of colonic polyps     Osteopenia of multiple sites     Vertigo     Current Outpatient Medications   Medication Sig Dispense Refill     amLODIPine (NORVASC) 10 MG tablet Take 1 tablet (10 mg total) by mouth daily. 90 tablet 3     aspirin 81 MG EC tablet Take 1 tablet (81 mg total) by mouth daily. 90 tablet 0     clopidogrel (PLAVIX) 75 mg tablet Take 1 tablet (75 mg total) by mouth daily. 90 tablet 3     nitroglycerin (NITROSTAT) 0.4 MG SL tablet        No current facility-administered medications for this visit.      Allergies   Allergen Reactions     Contrast [Iohexol] Itching     Iodinated Casein      Iodinated Contrast- Oral And Iv Dye Itching     Premedicated prior to CT scans and has no problems     Penicillins           Physical Exam     /88 (Patient Site: Right Arm, Patient Position: Sitting)   Pulse 66   Ht 5' 4.25\" (1.632 m)   Wt 178 lb (80.7 kg)   SpO2 100%   BMI 30.32 kg/m      General: This is an alert female in no apparent distress.     Additional Information   Social History     Tobacco Use     Smoking status: Former Smoker     " Smokeless tobacco: Never Used   Substance Use Topics     Alcohol use: Not on file     Drug use: Not on file            Nguyen Jordan MD

## 2021-06-01 NOTE — PROGRESS NOTES
Patient seen in heart care clinic by MDG. Verbal order to have patient directly admitted for syncope. Bed availability tight throughout . Spoke with nursing supervisor, bed possibly available around 1 hour. Updated Dr. Schumacher, and given verbal order that patient may wait in the lobby/in front of desk til bed available. Informed patient that bed will be available around 1500. Pt may wait in lobby area until bed available. Pt agreeable.     Pt ambulated to [facility] with SBA from [facility]. Walking strong at first, and then becoming slightly unsteady. Seated in chair in front of scheduling desk. Writer sat with patient and talked for approx 35 minutes. Pt informed to alert desk/scheduling staff if not feeling well. Informed writer that she feels fine and will just read. Writer returned to her office, bed available at 1450. Checked on patient and visualized patient seated in same chair in [facility], reading her book. Called verbal report to accepting RN on 4000. At approximately 1515, [facility] took patient via wheelchair to room 4148, and turned over patient care to LUKE Mckinney.

## 2021-06-01 NOTE — TELEPHONE ENCOUNTER
FYI - Status Update  Who is Calling: Patient  Update: I am calling to inform Nguyen Jordan MD I am being admitted to [facility].      Okay to leave a detailed message?:  Yes

## 2021-06-01 NOTE — PROGRESS NOTES
[facility] Heart Care Office Consult     Assessment:   1.  Patient reports long-standing history of what sounds like orthostatic hypotension although details are somewhat challenging.  She states that she is had this difficulty since childhood but has become profoundly worse.  She reports that she spent 13 days in the hospital in [facility] but we do not yet have the records.  Her blood pressure today is elevated but does drop approximately 50 mmHg upon standing.  Currently she is on amlodipine.  She reports that she has had not come out of her house for a few months except for doctor's visits because of her concern of passing out and injury.  She gives a number of examples of when she feels woozy and that she might pass out.  She states that she knows she is supposed to sit down or lie down but oftentimes tries to finish the activity.  She indicates that she can only walk one half block without having to stop because of getting dizzy or lightheaded.  I suspect that this may be orthostatic hypotension will need to further discern neurologic contribution.  Patient mentions a history of seizure and mentions phenobarbital but these details are limited.  Given the profound nature of her symptoms and the progressive nature of her symptoms I have recommended that we admit her to hospital for observation on telemetry, echocardiogram, neurologic consultation and further recommendations pending additional observation and requested information from the outside hospital.  I am concerned that an echocardiogram from a few years ago suggested at least moderate pulmonary hypertension and whether this is contributing to her symptomatology needs further investigation.    2.  Coronary artery disease history by report of stenting apparently in 2009 without clear-cut symptoms of angina.  Further work-up and recommendations pending the outcome of the additional plan hospitalization.  For uncertain reasons it appears that the  patient was taken off atorvastatin during a hospitalization May 2019.  1. Syncope  ECG Clinic - Today      Plan:   1.  Admit to hospital for additional evaluation of profound episodic orthostatic hypotension and syncope.  2.  Echocardiogram  3.  Telemetry monitoring  4.  Would favor neurologic consultation given concern about possible history of seizure disorder  Further recommendations pending the outcome of the planned tests and admission to hospital.        History of Present Illness:   Thank you for asking the [facility] Heart Care team to see Shira Omalley a 68 y.o.  female  in consultation  to evaluate symptoms of dizziness and syncope that sounds primarily orthostatic.  She gives a very difficult history to sort out.  Unfortunately we have received only some of the history from [place] and have requested some additional information.  She tells me she has a long-standing history dating back to childhood of near syncope or syncope.  She states that upon standing she often feels dizzy and lightheaded.  She tells me that she was hospitalized May 2019 in [facility] for a total of 13 days.  Unfortunately it is very difficult to sort out from her history what took place.  She tells me however that the symptoms of near syncope and syncope are getting worse.  She also describes a possible seizure disorder.  She states that she cannot walk more than a half a block without becoming profoundly dizzy and needing to sit down.  She states that she has basically been homebound because of her fear of syncope.  She has not had significant chest discomfort recently but does report that she feels a pounding sensation in her chest when she tries to sleep at night and therefore is not able to sleep and indicates that she is very tired.    Reviewing some of the records that we have received that she has a history of coronary artery disease with stenting to the LAD, history of hypertension,  history of lung cancer with prior treatment with CyberKnife, history of uterine cancer with hysterectomy, history of breast cancer with apparent chemotherapy.  History of parathyroid surgery.  Note from a cardiologist in [place] Dr. Diaz May 2018 indicated that she had been describing some dizziness and the suggestion at that time was to see a neurologist although I am not certain whether she did see neurology.  A Holter monitor was reported at that time.  Test results which I do have included an echocardiogram from May 2017 with left ventricular function was said to be normal but she did have evidence for pulmonary hypertension with right ventricular systolic pressure estimated 61 mmHg which was felt to be moderately elevated.  There is a carotid ultrasound from 2018 which was negative for significant stenosis and a nuclear stress test from 2017 that was negative for perfusion abnormality.    Cardiac risk factors include remote history of tobacco use, positive for hypertension, negative for known diabetes, patient states that multiple medications were discontinued when she was in hospital May 2019 including atorvastatin, antihypertensive medications except amlodipine and Plavix.    Past Medical History:     Past Medical History:   Diagnosis Date     CAD (coronary artery disease)     2 stents placed - 2011     Hypertension      Ovarian cancer (H)        Past Surgical History:     Past Surgical History:   Procedure Laterality Date     HYSTERECTOMY            LUNG LOBECTOMY       MASTECTOMY Bilateral      MASTECTOMY MODIFIED RADICAL Bilateral      PARATHYROID GLAND SURGERY       PARTIAL NEPHRECTOMY         Family History:     Family History   Problem Relation Age of Onset     Heart attack Mother      Heart attack Father      Heart attack Brother      Heart attack Other        Social History:    reports that she has quit smoking. She has never used smokeless tobacco.  The primary care physician is Nguyen Jordan  "MD CON  Meds:   Scheduled Meds:  Current Outpatient Medications   Medication Sig Dispense Refill     amLODIPine (NORVASC) 10 MG tablet Take 1 tablet (10 mg total) by mouth daily. 90 tablet 3     aspirin 81 MG EC tablet Take 1 tablet (81 mg total) by mouth daily. 90 tablet 0     clopidogrel (PLAVIX) 75 mg tablet Take 1 tablet (75 mg total) by mouth daily. 90 tablet 3     nitroglycerin (NITROSTAT) 0.4 MG SL tablet        No current facility-administered medications for this visit.        PRN Meds:.    Allergies:   Contrast [iohexol]; Iodinated casein; Iodinated contrast media; and Penicillins          Objective:      Physical Exam  179 lb 9.6 oz (81.5 kg)  5' 4.25\" (1.632 m)  Body mass index is 30.59 kg/m .  /90 (Patient Site: Left Arm, Patient Position: Sitting, Cuff Size: Adult Large)   Pulse 72   Resp 16   Ht 5' 4.25\" (1.632 m)   Wt 179 lb 9.6 oz (81.5 kg)   BMI 30.59 kg/m    Sitting 190/96, standing 140/90  General Appearance:   Alert, cooperative and in no acute distress.   HEENT:  No scleral icterus; the mucous membranes were pink and moist.   Neck: JVP does not appear obviously elevated.  His. No thyromegaly. No HJR, status post parathyroid surgery with scar.   Chest: The spine was straight. The chest was symmetric.   Lungs:   Respirations unlabored; the lungs are clear to auscultation.   Cardiovascular:   S1 and S2 without obvious significant murmur, clicks or rubs. Brachial, radial, carotid and posterior tibial pulses are intact and symetrical.  No carotid bruits noted   Abdomen:  No organomegaly, masses, bruits, or tenderness. Bowels sounds are present   Extremities: No cyanosis, clubbing, or edema.   Skin: No xanthelasma.   Neurologic: Mood and affect are appropriate.             Lab Reviewed Personally by myself    Lab Results   Component Value Date     12/31/2018    K 3.8 12/31/2018     12/31/2018    CO2 27 12/31/2018    BUN 27 12/31/2018    CREATININE 1.06 12/31/2018    CALCIUM 10.2 " 12/31/2018     No results found for: CHOL, TRIG, HDL, LDLDIRECT  No results found for: BNP  Creatinine (mg/dL)   Date Value   12/31/2018 1.06   10/05/2016 1.08   04/16/2014 1.31 (H)     No results found for: LDLCALC  Lab Results   Component Value Date    WBC 10.1 12/31/2018    HGB 13.1 12/31/2018    HCT 40.0 12/31/2018    MCV 98 12/31/2018     12/31/2018       Cardiac Testing:          ECG personally reviewed by myself shows  26-AUG-2019 13:37:26 HE JOES/JOHNS/DAV/TRISTON  Normal sinus rhythm  Voltage criteria for left ventricular hypertrophy  Long QTc  Abnormal ECG  When compared with ECG of 31-DEC-2018 07:53,  Questionable change in QRS axis  Criteria for Lateral infarct are no longer Present  Confirmed by LYN CALVO, CADEN LOC:DOV ([id number]) on 8/28/2019 3:30:51 PM  25mm/s 10mm/mV 40Hz 8.0 SP2 12SL 241 IVONNE: 31  Referred by: TONY SONG Confirmed By: LES LOC:DOV NICHOLSON MD  ECG demonstrates sinus rhythm, LVH, prolonged QTC     Review of Systems:       Review of Systems:   General: Weight Loss  Eyes: Visual Distubance  Ears/Nose/Throat: Hearing Loss  Lungs: Shortness of Breath  Heart: Irregular Heartbeat, Fainting(palpitpations)  Stomach: Nausea  Bladder: Frequent Urination at Night  Muscle/Joints: Muscle Pain  Skin: WNL  Nervous System: Falls, Dizziness, Loss of Balance  Mental Health: WNL     Blood: WNL      This note has been dictated using voice recognition software. Any grammatical or context distortions are unintentional and inherent to the software.

## 2021-06-02 NOTE — TELEPHONE ENCOUNTER
The goal is less than 140/90 but she has significant fluctuations in her blood pressure which has been and is being evaluated by Cardiology.  If they check her BP and it is high, they need to check it 6 times with a minute in between and take an average.  Today she had a reading of 134 and then 156 so again this is being evaluated.  Thanks.

## 2021-06-02 NOTE — TELEPHONE ENCOUNTER
Orders being requested:   Extend the nurse eval and treat order for once in next 4 days.  ( The patient had gone to the [facility] for the weekend and has just returned home.)  Reason service is needed/diagnosis: Syncopal episodes  Medication management  When are orders needed by: ASAP  Where to send Orders: Phone:  [phone]  Okay to leave detailed message?  Yes

## 2021-06-02 NOTE — TELEPHONE ENCOUNTER
Dr. Stephens,    Please see below BP reading from home care. Any recommendations or is this okay to wait for Dr. Jordan's return tomorrow?    Meg GERBER LPN .......... 1:24 PM  10/23/19

## 2021-06-02 NOTE — TELEPHONE ENCOUNTER
Who is calling:  Armen Chua From [facility]   Reason for Call:  Caller is requesting for updated Home Care referral orders and also  physical therapy and occupational therapy .  Date of last appointment with primary care: 8/26/19  Okay to leave a detailed message: No

## 2021-06-02 NOTE — TELEPHONE ENCOUNTER
Question following Office Visit  When did you see your provider: 10/25/19    What is your question: Home care has question regarding OV today. Wants to know what her BP goal should be?    Okay to leave a detailed message: Yes

## 2021-06-02 NOTE — TELEPHONE ENCOUNTER
Left message to call back for: Luisa  Information to relay to patient:  Left message that should be on both an ASA and Plavix.

## 2021-06-02 NOTE — TELEPHONE ENCOUNTER
FYI - Status Update  Who is Calling: Home Care  Update: [facility] has been trying to reach patient for over a week with no return call from patient.  The episode is being closed.  FYI  Okay to leave a detailed message?:  Yes   [phone]

## 2021-06-02 NOTE — PROGRESS NOTES
Office Visit   Shira Omalley   68 y.o. female    Date of Visit: 10/25/2019    Chief Complaint   Patient presents with     Follow-up     Blood pressure        Assessment and Plan   1. Coronary artery disease involving native coronary artery of native heart with angina pectoris (H)  She is currently on Plavix and aspirin.  Her recent cardiac evaluation was satisfactory including a negative sestamibi stress test.  She has a lot of muscle cramps and I am going to decrease the dosing of her Lipitor to see if that helps.  She has follow-up with cardiology in December.  She has not had recent chest pain.  She is in agreement with this plan and I will see her back in 3 months.  - clopidogrel (PLAVIX) 75 mg tablet; Take 1 tablet (75 mg total) by mouth daily.  Dispense: 90 tablet; Refill: 3  - atorvastatin (LIPITOR) 10 MG tablet; Take 1 tablet (10 mg total) by mouth at bedtime.  Dispense: 90 tablet; Refill: 3  - aspirin 81 MG EC tablet; Take 1 tablet (81 mg total) by mouth daily.  Dispense: 90 tablet; Refill: 3    2. Nonintractable epilepsy without status epilepticus, unspecified epilepsy type (H)  She is now on Keppra.  She does need to see a neurologist.  She has ongoing episodes of shaking and dizziness.  She has fluctuations in her blood pressure which are of unclear etiology.  I am going to order a consultation with neurology.  - levETIRAcetam (KEPPRA) 500 MG tablet; Take 1 tablet (500 mg total) by mouth 2 (two) times a day.  Dispense: 180 tablet; Refill: 3  - Ambulatory referral to Neurology    3. Essential hypertension  Her blood pressure satisfactory today on amlodipine.  - amLODIPine (NORVASC) 10 MG tablet; Take 1 tablet (10 mg total) by mouth daily.  Dispense: 90 tablet; Refill: 3    4. Vitamin B12 deficiency (non anemic)  I did put B12 supplementation on her list.  - cyanocobalamin 1000 MCG tablet; Take 1 tablet (1,000 mcg total) by mouth daily.  Dispense: 100 tablet; Refill: 3    5. Vitamin D  deficiency  - cholecalciferol, vitamin D3, (VITAMIN D3) 1,000 unit capsule; Take 1 capsule (1,000 Units total) by mouth daily.  Dispense: 90 capsule; Refill: 3         Return in about 3 months (around 1/25/2020) for Recheck - 40 minutes.     History of Present Illness   This 68 y.o. old sent to follow-up.  She has a history of coronary artery disease, hypertension, multiple cancers, seizure disorder and ongoing symptoms of dizziness and blood pressure fluctuations.  She also notes shakiness.  She was hospitalized in early October due to a spell.  She had numerous tests which did not show any cardiac cause of her symptoms.  Her EEG was slightly abnormal and she is now on Keppra.  She does need follow-up with neurology.  She has no other acute concerns today.  Her blood pressure satisfactory.  She has cardiology follow-up scheduled in December.  She would like to do a more complete exam today.  She has had a history of breast cancer with mastectomies.  She has had a hysterectomy due to ovarian cancer.  Again she has no other acute concerns today.  She recently did blood work that was satisfactory.    Review of Systems: As above, systems otherwise reviewed and negative.     Medications, Allergies and Problem List   Patient Active Problem List   Diagnosis     Coronary artery disease involving native coronary artery of native heart with angina pectoris (H)     Dyslipidemia, goal LDL below 70     Hyperparathyroidism (H)     Sleep apnea     Essential hypertension     History of colonic polyps     Orthostatic hypotension     Nonintractable epilepsy without status epilepticus, unspecified epilepsy type (H)     Vitamin D deficiency     Vitamin B12 deficiency (non anemic)     Current Outpatient Medications   Medication Sig Dispense Refill     amLODIPine (NORVASC) 10 MG tablet Take 1 tablet (10 mg total) by mouth daily. 90 tablet 3     aspirin 81 MG EC tablet Take 1 tablet (81 mg total) by mouth daily. 90 tablet 3      "atorvastatin (LIPITOR) 10 MG tablet Take 1 tablet (10 mg total) by mouth at bedtime. 90 tablet 3     clopidogrel (PLAVIX) 75 mg tablet Take 1 tablet (75 mg total) by mouth daily. 90 tablet 3     levETIRAcetam (KEPPRA) 500 MG tablet Take 1 tablet (500 mg total) by mouth 2 (two) times a day. 180 tablet 3     albuterol (PROAIR HFA;PROVENTIL HFA;VENTOLIN HFA) 90 mcg/actuation inhaler Inhale 2 puffs every 6 (six) hours as needed for wheezing or shortness of breath.       cholecalciferol, vitamin D3, (VITAMIN D3) 1,000 unit capsule Take 1 capsule (1,000 Units total) by mouth daily. 90 capsule 3     cyanocobalamin 1000 MCG tablet Take 1 tablet (1,000 mcg total) by mouth daily. 100 tablet 3     nitroglycerin (NITROSTAT) 0.4 MG SL tablet Place 0.4 mg under the tongue every 5 (five) minutes as needed.              No current facility-administered medications for this visit.      Allergies   Allergen Reactions     Contrast [Iohexol] Itching     Iodinated Casein      Iodinated Contrast Media Itching     Premedicated prior to CT scans and has no problems     Penicillins           Physical Exam     /74 (Patient Site: Right Arm, Patient Position: Sitting)   Pulse 94   Ht 5' 4\" (1.626 m)   Wt 182 lb (82.6 kg)   SpO2 97%   BMI 31.24 kg/m      General:  Patient is alert and in no apparent distress.  Neck:  Supple with no adenopathy or thyroid abnormality noted.  Breast: She is status post bilateral mastectomies.  No chest wall masses or axillary lymphadenopathy are noted.  Cardiovascular:  Regular rate and rhythm, normal S1/S2, no murmurs, rubs, or gallop.  Pulmonary:  Lungs are clear to auscultation bilaterally with normal respiratory effort.  Gastrointestinal:  Abdomen is soft, non-tender, non-distended, with no organomegaly, rebound or guarding.  Extremities:  No joint abnormalities with no LE edema.  Strong dorsalis pedis pulses.  Neurologic Cranial nerves are intact.  No focal deficits.  Psychiatric:  Pleasant, no " confusion or agitation   Skin:  Warm and well perfused with no rashes or abnormalities.  Pelvic: Normal external genitalia and normal pelvic exam.         Additional Information   Social History     Tobacco Use     Smoking status: Former Smoker     Packs/day: 0.00     Smokeless tobacco: Never Used   Substance Use Topics     Alcohol use: Never     Frequency: Never     Drug use: Never            Nguyen Jordan MD

## 2021-06-02 NOTE — TELEPHONE ENCOUNTER
During routine hospital discharge call, pt reported she has been staying with her granddaughter in [place] since discharge. Pt will be coming home tomorrow. Pt would like home care.   Please put in a new order for home care, if OK with Dr Jordan.

## 2021-06-02 NOTE — TELEPHONE ENCOUNTER
Orders called to Luisa    She also notes that patient is on:  ASA 81mg AND  [id number]    Is she continue both? Or d/c one?

## 2021-06-02 NOTE — TELEPHONE ENCOUNTER
Orders being requested:   Skilled Nursing Assessment  Reason service is needed/diagnosis:   Chronic Kidney Disease  Hypertension  When are orders needed by: Asap  Where to send Orders: Phone:  Verbal orders to Sheba gonzales [facility], [phone]  Okay to leave detailed message?  Yes

## 2021-06-02 NOTE — TELEPHONE ENCOUNTER
Orders being requested: delayed start for physical therapy and occupational therapy  Reason service is needed/diagnosis: patient is requesting to start next week instead   When are orders needed by: ASAP  Where to send Orders: Phone:  [phone]  Okay to leave detailed message?  Yes

## 2021-06-02 NOTE — PROGRESS NOTES
Hospital discharge follow up call to pt, no answer.  Left VM message reminding pt of appt on 10/10. RN will attempt call back at another time.

## 2021-06-02 NOTE — TELEPHONE ENCOUNTER
Orders being requested: Skilled Nursing   -One Time a week for Six weeks   - Two as needed visits   Reason service is needed/diagnosis: Medication and Disease Management , Home Safety .  When are orders needed by: As soon as possible .  Where to send Orders: Phone:  [phone]  Okay to leave detailed message?  Yes

## 2021-06-02 NOTE — PROGRESS NOTES
"TCM DISCHARGE FOLLOW UP CALL    Discharge Date:  10/4/2019  Reason for hospital stay (discharge diagnosis)::  Syncope and collapse  Are you feeling better, the same or worse since your discharge?:  Patient is feeling the same (Pt is having dizziness and \"vertigo\". No recent falls. She is unsteady, is using walker. She has been staying with her granddtr in [place].)  Do you feel like you have a plan in the event of a health emergency?: Yes (Family)    As part of your discharge plan, were  home care services ordered for you?: Yes    Have you seen them yet, or are they scheduled to visit?: No (Home care wasn't able to reach pt. Pt is requesting home care, she will be returning home 10/11. request sent to clinic to put in new order.)    Did you receive any new medications, or was there a change to your medications?: Yes    Are you taking those medications, or do you have any established regiment?:  Pt is taking atorvastatin at  and [facility] two times a day   Do you have any follow up visits scheduled with your PCP or Specialist?:  No (Pt cancelled 10/10 appt)  I'm glad to hear you're doing well and we want you to continue to do well. Your PCP would like to see you for a follow-up visit. Can we help set that up for your today?: Yes    (RN) Patient was assisted in making appointment and/or given number to [facility].  If there are immediate concerns, In Basket messge route to the PCP with a summary of concern.:  RN assisted patient in scheduling appt (10/21 (not within 14 days of discharge. Pt needed 3 days notice to get transportation.))  RN NOTES::  Asked pt to strt checking her BPs after taking amlodipine and cortney jack log, Bring results to [facility]. Pt agreed    "

## 2021-06-02 NOTE — TELEPHONE ENCOUNTER
Left message to call back for: Shira  Information to relay to patient:  Left message with details.

## 2021-06-02 NOTE — TELEPHONE ENCOUNTER
FYI - Status Update  Who is Calling: Sheba with [facility], [phone]  Update:   Sheba states patient's BP is at 198/108 resting rate.  Patient took her BP medication at 7am or 8am this morning.    HR - 85  Sats - 96  Temp was not taken.  Patient is feeling ok.  Patient did not sleep well last night.  Patient's apartment was hot and patient was sweaty.  Sheba shut off the heat and patient is more comfortable at time of call.  Please reach out to Sheba if Provider has any questions.  Okay to leave a detailed message?:  Yes

## 2021-06-03 VITALS
BODY MASS INDEX: 31.07 KG/M2 | SYSTOLIC BLOOD PRESSURE: 150 MMHG | WEIGHT: 182 LBS | DIASTOLIC BLOOD PRESSURE: 76 MMHG | RESPIRATION RATE: 20 BRPM | HEIGHT: 64 IN | HEART RATE: 92 BPM

## 2021-06-03 VITALS — BODY MASS INDEX: 30.05 KG/M2 | WEIGHT: 176 LBS | HEIGHT: 64 IN

## 2021-06-03 VITALS — BODY MASS INDEX: 30.39 KG/M2 | WEIGHT: 178 LBS | HEIGHT: 64 IN

## 2021-06-03 VITALS
DIASTOLIC BLOOD PRESSURE: 78 MMHG | OXYGEN SATURATION: 97 % | WEIGHT: 187 LBS | SYSTOLIC BLOOD PRESSURE: 144 MMHG | BODY MASS INDEX: 31.92 KG/M2 | HEART RATE: 82 BPM | HEIGHT: 64 IN

## 2021-06-03 VITALS
SYSTOLIC BLOOD PRESSURE: 134 MMHG | DIASTOLIC BLOOD PRESSURE: 74 MMHG | HEART RATE: 94 BPM | OXYGEN SATURATION: 97 % | HEIGHT: 64 IN | BODY MASS INDEX: 31.07 KG/M2 | WEIGHT: 182 LBS

## 2021-06-03 VITALS
SYSTOLIC BLOOD PRESSURE: 160 MMHG | WEIGHT: 179.6 LBS | HEIGHT: 64 IN | BODY MASS INDEX: 30.66 KG/M2 | HEART RATE: 72 BPM | DIASTOLIC BLOOD PRESSURE: 90 MMHG | RESPIRATION RATE: 16 BRPM

## 2021-06-03 NOTE — TELEPHONE ENCOUNTER
----- Message from Bebo Smith sent at 11/19/2019  9:06 AM CST -----  Regarding: Pt to report BPs  General phone call:    Caller: Pt    Primary cardiologist: Dr Schumacher    Detailed reason for call: Pt was to call in and update with BP's - last OV 11/15    New or active symptoms? Na    Best phone number: [phone]    Best time to contact: any    Ok to leave a detailed message? Yes    Device? No    Additional Info:

## 2021-06-03 NOTE — TELEPHONE ENCOUNTER
Medication Request  Medication name:  nitroglycerin (NITROSTAT) 0.4 MG SL tablet  0.4 mg, Sublingual, Every 5 min PRN  EditCancel Reorder       Summary: Place 0.4 mg under the tongue every 5 (five) minutes as needed.           Pharmacy Name and Location: [facility]  Reason for request: Needs refill  When did you use medication last?:  unsure  Patient offered appointment:  no  Okay to leave a detailed message: no [phone]

## 2021-06-03 NOTE — TELEPHONE ENCOUNTER
----- Message from Rekha Sky sent at 11/29/2019 10:27 AM CST -----  Regarding: BP vitals  Caller: Patient    Primary cardiologist: Dr. Schumacher    Detailed reason for call: Patient called to give her BP vitals     Best phone number: [phone]    Best time to contact: today    Ok to leave a detailed message? Yes    Device? No

## 2021-06-03 NOTE — TELEPHONE ENCOUNTER
----- Message from Annette Stoddard sent at 11/21/2019  4:11 PM CST -----  Regarding: BP  Caller: Shira    Primary cardiologist: Dr. Schumacher    Detailed reason for call: Has BP to report.    Best phone number: [phone]    Best time to contact: Any  Ok to leave a detailed message? Yes

## 2021-06-03 NOTE — TELEPHONE ENCOUNTER
----- Message from Bebo Smith sent at 11/15/2019  1:16 PM CST -----  Regarding: Pt to update Dr Schumacher on meds  General phone call:    Caller: Pt    Primary cardiologist: Dr Schumacher    Detailed reason for call: Pt was seen today - Pt forgot what med she was taking - asked to call back to update - HYDROCHLOROTHIAZIDE, LOSARTAN POTASSIUM 100mg 2x daily, amLODIPine (NORVASC) 10 MG tablet --- uncontrolled HTN  Pt states she was taking all of these medications    Please call Pt back to discuss    New or active symptoms? na  Best phone number: [phone]  Best time to contact: any  Ok to leave a detailed message? yes  Device? no    Additional Info:

## 2021-06-03 NOTE — TELEPHONE ENCOUNTER
Left message to call back for: Shira  Information to relay to patient:  We got refill requests for her from [facility] for diabetic test strips, lancets and swabs. Need to know how many times a day pt is testing her blood sugar. We have nothing in the chart. Thanks.

## 2021-06-03 NOTE — TELEPHONE ENCOUNTER
Instructed pt to bring blood pressures to 12/5/19 appt with Spike.  Pt verbalized understanding and agreed to plan.  -crystal

## 2021-06-03 NOTE — TELEPHONE ENCOUNTER
----- Message from Olga Iglesias sent at 11/26/2019 11:37 AM CST -----  General phone call:    Caller: Patient   Primary cardiologist: Dr. Schumacher  Detailed reason for call: Patient was calling in her blood pressure readings.  She indicated the nurse is always so busy and can't take the readings  New or active symptoms?   Best phone number: [phone]  Best time to contact: Anytime  Ok to leave a detailed message? Yes  Device? no    Additional Info:

## 2021-06-03 NOTE — PROGRESS NOTES
Office Visit   Shira Omalley   69 y.o. female    Date of Visit: 11/18/2019    Chief Complaint   Patient presents with     Follow-up     High blood pressure        Assessment and Plan   1. Essential hypertension  Her blood pressure today is satisfactory.  I reviewed her readings from her blood pressure machine and discussed that they were essentially normal.  I also reviewed a note from Dr. Goins regarding how to check her blood pressure and when to take an extra amlodipine.  She now has this information and will continue to monitor.    2. Dyslipidemia, goal LDL below 70  Her last lipids were satisfactory.  She does have leg cramping in the right leg mostly at night.  She has been taking trazodone at night which she states does help with her sleep.  We discussed adding gabapentin but she is not interested in that.  States it was not helpful when she took it in the past.  She has no other acute concerns.  I am not sure if this is improved when she decreased her Lipitor recently.    3. Nonintractable epilepsy without status epilepticus, unspecified epilepsy type (H)  She is going to be seeing neurology.  It was recommended by Dr. Goins that she see a specialist in autonomic dysfunction as well.  I have given her the information on that provider.    4. Sleep apnea, unspecified type  She has a sleep study scheduled.  - traZODone (DESYREL) 50 MG tablet; Take 0.5 tablets (25 mg total) by mouth at bedtime as needed.  Dispense: 45 tablet; Refill: 3         No follow-ups on file.     History of Present Illness   This 69 y.o. old female comes in to follow-up.  She has hypertension and has had a lot of difficulties with high and low blood pressures.  She saw cardiology last week and I have reviewed their consultation.  Her blood pressure satisfactory today.  I did review their recommendations on checking her blood pressure.  She is also being scheduled with a seizure specialist and autonomic specialist in neurology.  She  has a Holter monitor pending.  She has no other acute concerns today.    Review of Systems: As above, systems otherwise reviewed and negative.     Medications, Allergies and Problem List   Patient Active Problem List   Diagnosis     Coronary artery disease involving native coronary artery of native heart with angina pectoris (H)     Dyslipidemia, goal LDL below 70     Hyperparathyroidism (H)     Sleep apnea     Essential hypertension     History of colonic polyps     Orthostatic hypotension     Nonintractable epilepsy without status epilepticus, unspecified epilepsy type (H)     Vitamin D deficiency     Vitamin B12 deficiency (non anemic)     Current Outpatient Medications   Medication Sig Dispense Refill     albuterol (PROAIR HFA;PROVENTIL HFA;VENTOLIN HFA) 90 mcg/actuation inhaler Inhale 2 puffs every 6 (six) hours as needed for wheezing or shortness of breath. 8.5 g 3     amLODIPine (NORVASC) 10 MG tablet Take 1 tablet (10 mg total) by mouth daily. 90 tablet 3     aspirin 81 MG EC tablet Take 1 tablet (81 mg total) by mouth daily. 90 tablet 1     atorvastatin (LIPITOR) 10 MG tablet Take 1 tablet (10 mg total) by mouth at bedtime. 90 tablet 3     cholecalciferol, vitamin D3, (VITAMIN D3) 1,000 unit capsule Take 1 capsule (1,000 Units total) by mouth daily. 90 capsule 3     cyanocobalamin 1000 MCG tablet Take 1 tablet (1,000 mcg total) by mouth daily. 90 tablet 3     levETIRAcetam (KEPPRA) 500 MG tablet Take 1 tablet (500 mg total) by mouth 2 (two) times a day. 180 tablet 3     nitroglycerin (NITROSTAT) 0.4 MG SL tablet Place 1 tablet (0.4 mg total) under the tongue every 5 (five) minutes as needed. 50 tablet 2     traZODone (DESYREL) 50 MG tablet Take 0.5 tablets (25 mg total) by mouth at bedtime as needed. 45 tablet 3     clopidogrel (PLAVIX) 75 mg tablet Take 1 tablet (75 mg total) by mouth daily. 90 tablet 1     No current facility-administered medications for this visit.      Allergies   Allergen Reactions      "Contrast [Iohexol] Itching     Iodinated Casein      Iodinated Contrast Media Itching     Premedicated prior to CT scans and has no problems     Penicillins           Physical Exam     /78 (Patient Site: Right Arm, Patient Position: Sitting)   Pulse 82   Ht 5' 4.25\" (1.632 m)   Wt 187 lb (84.8 kg)   SpO2 97%   BMI 31.85 kg/m      General: This is an alert female in no apparent distress.     Additional Information   Social History     Tobacco Use     Smoking status: Former Smoker     Packs/day: 0.00     Smokeless tobacco: Never Used   Substance Use Topics     Alcohol use: Never     Frequency: Never     Drug use: Never            Nguyen Jordan MD    "

## 2021-06-03 NOTE — TELEPHONE ENCOUNTER
Cardiologist Dr. Diaz from [facility] prescribed medications below.    Reiterated instructions given to her today:  Patient Instructions     Please ask the nurses to check your blood pressure sitting and standing and call my nurse with some numbers over the next 1 to 2 weeks.Stop the plavix and take 2 of the 81mg aspirin or 162mg each day.My nurse is Olya and her number is [phone]     When you check your blood pressure please sit and relax for at least 10 minutes when you check you blood pressure.Call if your blood pressure is consistently higher than 180 on the top.You can take one additional amlodipine for a blood pressure that stays over 180 on the top.check your blood pressure if its higher than 180 sit and relax and recheck in 1 to 2 hours and if still above 180 you can one additional amlodipine.Please reji my nurse if you are taking additional amlodipine.            No further questions or concerns at this time.

## 2021-06-03 NOTE — TELEPHONE ENCOUNTER
Pt reports the following:    Sitting Stand   142/79 129/71   117/76 112/82   134/74 105/88   143/74 120/68   133/87 126/84   141/87 140/74   121/77 120/76   119/72 115/68   122/82 139/69     Dr Schumacher - any new recommendations?  -[facility]

## 2021-06-03 NOTE — TELEPHONE ENCOUNTER
Refill Request  Did you contact pharmacy: No  Medication name:   Requested Prescriptions     Pending Prescriptions Disp Refills     nitroglycerin (NITROSTAT) 0.4 MG SL tablet 50 tablet 2     Sig: Place 1 tablet (0.4 mg total) under the tongue every 5 (five) minutes as needed.     Who prescribed the medication: Nguyen Jordan MD   Pharmacy Name and Location: [facility]  Is patient out of medication: n/a  Patient notified refills processed in 72 hours:  n/a  Okay to leave a detailed message: yes  [phone]  Fax [phone]

## 2021-06-03 NOTE — TELEPHONE ENCOUNTER
FYI - Status Update  Who is Calling: Home Care  Update: Nathaniel was calling to let PCP know that patients blood pressure was 184/126 today. All other stats were normal. Patient has some dizziness but that is not unusual. No chest pain. Please advise.   Okay to leave a detailed message?:  Yes

## 2021-06-03 NOTE — TELEPHONE ENCOUNTER
----- Message from Annette Stoddard sent at 12/2/2019  8:05 AM CST -----  Regarding: ELLA  Caller: Shira    Primary cardiologist: Dr. Schumacher    Detailed reason for call: Has vitals to give RN. Please call back.     Best phone number: [phone]    Best time to contact: Any    Ok to leave a detailed message? Yes    Device? No

## 2021-06-03 NOTE — TELEPHONE ENCOUNTER
RN cannot approve Refill Request: Vitamin D3    RN can NOT refill this medication med is not covered by policy/route to provider. Last office visit: 10/25/2019 Nguyen Jordan MD Last Physical: Visit date not found Last MTM visit: Visit date not found Last visit same specialty: 10/25/2019 Nguyen Jordan MD.  Next visit within 3 mo: Visit date not found  Next physical within 3 mo: Visit date not found    RN cannot approve Refill Request: Albuterol inhaler    RN can NOT refill this medication historical medication requested. Last office visit: 10/25/2019 Nguyen Jordan MD Last Physical: Visit date not found Last MTM visit: Visit date not found Last visit same specialty: 10/25/2019 Nguyen Jordan MD.  Next visit within 3 mo: Visit date not found  Next physical within 3 mo: Visit date not found    Refills Approved x 6  CHANGE IN PHARMACY TO MAIL ORDER NOTED.    Rx renewed per Medication Renewal Policy. Medications were last renewed on 10/25/2019 with 3 refills each. .  Last office visit: 10/25/2019 with PCP Dr GIOVANNY Flynn, [facility] Triage/Med Refill 11/7/2019     Requested Prescriptions   Pending Prescriptions Disp Refills     albuterol (PROAIR HFA;PROVENTIL HFA;VENTOLIN HFA) 90 mcg/actuation inhaler 8.5 g 3     Sig: Inhale 2 puffs every 6 (six) hours as needed for wheezing or shortness of breath.       Albuterol/Levalbuterol Refill Protocol Passed - 11/7/2019 10:47 AM        Passed - PCP or prescribing provider visit in last year     Last office visit with prescriber/PCP: 10/25/2019 Nguyen Jordan MD OR same dept: 10/25/2019 Nguyen Jordna MD OR same specialty: 10/25/2019 Nguyen Jordan MD Last physical: Visit date not found       Next appt within 3 mo: Visit date not found  Next physical within 3 mo: Visit date not found  Prescriber OR PCP: Nguyen Jordan MD  Last diagnosis associated with med order: 1. Essential hypertension  - amLODIPine (NORVASC) 10 MG tablet;  Take 1 tablet (10 mg total) by mouth daily.  Dispense: 90 tablet; Refill: 3    2. Coronary artery disease involving native coronary artery of native heart with angina pectoris (H)  - aspirin 81 MG EC tablet; Take 1 tablet (81 mg total) by mouth daily.  Dispense: 90 tablet; Refill: 3  - atorvastatin (LIPITOR) 10 MG tablet; Take 1 tablet (10 mg total) by mouth at bedtime.  Dispense: 90 tablet; Refill: 3  - clopidogrel (PLAVIX) 75 mg tablet; Take 1 tablet (75 mg total) by mouth daily.  Dispense: 90 tablet; Refill: 3    3. Vitamin D deficiency  - cholecalciferol, vitamin D3, (VITAMIN D3) 1,000 unit capsule; Take 1 capsule (1,000 Units total) by mouth daily.  Dispense: 90 capsule; Refill: 3    4. Vitamin B12 deficiency (non anemic)  - cyanocobalamin 1000 MCG tablet; Take 1 tablet (1,000 mcg total) by mouth daily.  Dispense: 100 tablet; Refill: 3    5. Nonintractable epilepsy without status epilepticus, unspecified epilepsy type (H)  - levETIRAcetam (KEPPRA) 500 MG tablet; Take 1 tablet (500 mg total) by mouth 2 (two) times a day.  Dispense: 180 tablet; Refill: 3    If protocol passes may refill for 6 months if within 3 months of last provider visit (or a total of 9 months). If patient requesting >1 inhaler per month refill x 6 months and have patient make appointment with provider.          amLODIPine (NORVASC) 10 MG tablet 90 tablet 3     Sig: Take 1 tablet (10 mg total) by mouth daily.       Calcium-Channel Blockers Protocol Passed - 11/7/2019 10:47 AM        Passed - PCP or prescribing provider visit in past 12 months or next 3 months     Last office visit with prescriber/PCP: 10/25/2019 Nguyen Jordan MD OR same dept: 10/25/2019 Nguyen Jordan MD OR same specialty: 10/25/2019 Nguyen Jordan MD  Last physical: Visit date not found Last MTM visit: Visit date not found   Next visit within 3 mo: Visit date not found  Next physical within 3 mo: Visit date not found  Prescriber OR PCP: Nguyen Jordan  MD  Last diagnosis associated with med order: 1. Essential hypertension  - amLODIPine (NORVASC) 10 MG tablet; Take 1 tablet (10 mg total) by mouth daily.  Dispense: 90 tablet; Refill: 3    2. Coronary artery disease involving native coronary artery of native heart with angina pectoris (H)  - aspirin 81 MG EC tablet; Take 1 tablet (81 mg total) by mouth daily.  Dispense: 90 tablet; Refill: 3  - atorvastatin (LIPITOR) 10 MG tablet; Take 1 tablet (10 mg total) by mouth at bedtime.  Dispense: 90 tablet; Refill: 3  - clopidogrel (PLAVIX) 75 mg tablet; Take 1 tablet (75 mg total) by mouth daily.  Dispense: 90 tablet; Refill: 3    3. Vitamin D deficiency  - cholecalciferol, vitamin D3, (VITAMIN D3) 1,000 unit capsule; Take 1 capsule (1,000 Units total) by mouth daily.  Dispense: 90 capsule; Refill: 3    4. Vitamin B12 deficiency (non anemic)  - cyanocobalamin 1000 MCG tablet; Take 1 tablet (1,000 mcg total) by mouth daily.  Dispense: 100 tablet; Refill: 3    5. Nonintractable epilepsy without status epilepticus, unspecified epilepsy type (H)  - levETIRAcetam (KEPPRA) 500 MG tablet; Take 1 tablet (500 mg total) by mouth 2 (two) times a day.  Dispense: 180 tablet; Refill: 3    If protocol passes may refill for 12 months if within 3 months of last provider visit (or a total of 15 months).             Passed - Blood pressure filed in past 12 months     BP Readings from Last 1 Encounters:   11/05/19 (!) 145/96             aspirin 81 MG EC tablet 90 tablet 3     Sig: Take 1 tablet (81 mg total) by mouth daily.       Aspirin/Dipyridamole Refill Protocol Passed - 11/7/2019 10:47 AM        Passed - PCP or prescribing provider visit in past 12 months       Last office visit with prescriber/PCP: 10/25/2019 Nguyen Jordan MD OR same dept: 10/25/2019 Nguyen Jordan MD OR same specialty: 10/25/2019 Nguyen Jordan MD  Last physical: Visit date not found Last MTM visit: Visit date not found    Next appt within 3 mo: Visit date  not found Next physical within 3 mo: Visit date not found  Prescriber OR PCP: Nguyen Jordan MD  Last diagnosis associated with med order: 1. Essential hypertension  - amLODIPine (NORVASC) 10 MG tablet; Take 1 tablet (10 mg total) by mouth daily.  Dispense: 90 tablet; Refill: 3    2. Coronary artery disease involving native coronary artery of native heart with angina pectoris (H)  - aspirin 81 MG EC tablet; Take 1 tablet (81 mg total) by mouth daily.  Dispense: 90 tablet; Refill: 3  - atorvastatin (LIPITOR) 10 MG tablet; Take 1 tablet (10 mg total) by mouth at bedtime.  Dispense: 90 tablet; Refill: 3  - clopidogrel (PLAVIX) 75 mg tablet; Take 1 tablet (75 mg total) by mouth daily.  Dispense: 90 tablet; Refill: 3    3. Vitamin D deficiency  - cholecalciferol, vitamin D3, (VITAMIN D3) 1,000 unit capsule; Take 1 capsule (1,000 Units total) by mouth daily.  Dispense: 90 capsule; Refill: 3    4. Vitamin B12 deficiency (non anemic)  - cyanocobalamin 1000 MCG tablet; Take 1 tablet (1,000 mcg total) by mouth daily.  Dispense: 100 tablet; Refill: 3    5. Nonintractable epilepsy without status epilepticus, unspecified epilepsy type (H)  - levETIRAcetam (KEPPRA) 500 MG tablet; Take 1 tablet (500 mg total) by mouth 2 (two) times a day.  Dispense: 180 tablet; Refill: 3    If protocol passes may refill for 6 months if within 3 months of last provider visit (or a total of 9 months).          atorvastatin (LIPITOR) 10 MG tablet 90 tablet 3     Sig: Take 1 tablet (10 mg total) by mouth at bedtime.       Statins Refill Protocol (Hmg CoA Reductase Inhibitors) Passed - 11/7/2019 10:47 AM        Passed - PCP or prescribing provider visit in past 12 months      Last office visit with prescriber/PCP: 10/25/2019 Nguyen Jordan MD OR same dept: 10/25/2019 Nguyen Jordan MD OR same specialty: 10/25/2019 Nguyen Jordan MD  Last physical: Visit date not found Last MTM visit: Visit date not found   Next visit within 3 mo: Visit  date not found  Next physical within 3 mo: Visit date not found  Prescriber OR PCP: Nguyen Jordan MD  Last diagnosis associated with med order: 1. Essential hypertension  - amLODIPine (NORVASC) 10 MG tablet; Take 1 tablet (10 mg total) by mouth daily.  Dispense: 90 tablet; Refill: 3    2. Coronary artery disease involving native coronary artery of native heart with angina pectoris (H)  - aspirin 81 MG EC tablet; Take 1 tablet (81 mg total) by mouth daily.  Dispense: 90 tablet; Refill: 3  - atorvastatin (LIPITOR) 10 MG tablet; Take 1 tablet (10 mg total) by mouth at bedtime.  Dispense: 90 tablet; Refill: 3  - clopidogrel (PLAVIX) 75 mg tablet; Take 1 tablet (75 mg total) by mouth daily.  Dispense: 90 tablet; Refill: 3    3. Vitamin D deficiency  - cholecalciferol, vitamin D3, (VITAMIN D3) 1,000 unit capsule; Take 1 capsule (1,000 Units total) by mouth daily.  Dispense: 90 capsule; Refill: 3    4. Vitamin B12 deficiency (non anemic)  - cyanocobalamin 1000 MCG tablet; Take 1 tablet (1,000 mcg total) by mouth daily.  Dispense: 100 tablet; Refill: 3    5. Nonintractable epilepsy without status epilepticus, unspecified epilepsy type (H)  - levETIRAcetam (KEPPRA) 500 MG tablet; Take 1 tablet (500 mg total) by mouth 2 (two) times a day.  Dispense: 180 tablet; Refill: 3    If protocol passes may refill for 12 months if within 3 months of last provider visit (or a total of 15 months).             cholecalciferol, vitamin D3, (VITAMIN D3) 1,000 unit capsule 90 capsule 3     Sig: Take 1 capsule (1,000 Units total) by mouth daily.       There is no refill protocol information for this order        clopidogrel (PLAVIX) 75 mg tablet 90 tablet 3     Sig: Take 1 tablet (75 mg total) by mouth daily.       Clopidogrel/Prasugrel/Ticagrelor Refill Protocol Passed - 11/7/2019 10:47 AM        Passed - PCP or prescribing provider visit in past 6 months       Last office visit with prescriber/PCP: 10/25/2019 OR same dept: 10/25/2019  Nguyen Jordan MD OR same specialty: 10/25/2019 Nguyen Jordan MD Last physical: Visit date not found Last MTM visit: Visit date not found     Next appt within 3 mo: Visit date not found  Next physical within 3 mo: Visit date not found  Prescriber OR PCP: Nguyen Jordan MD  Last diagnosis associated with med order: 1. Essential hypertension  - amLODIPine (NORVASC) 10 MG tablet; Take 1 tablet (10 mg total) by mouth daily.  Dispense: 90 tablet; Refill: 3    2. Coronary artery disease involving native coronary artery of native heart with angina pectoris (H)  - aspirin 81 MG EC tablet; Take 1 tablet (81 mg total) by mouth daily.  Dispense: 90 tablet; Refill: 3  - atorvastatin (LIPITOR) 10 MG tablet; Take 1 tablet (10 mg total) by mouth at bedtime.  Dispense: 90 tablet; Refill: 3  - clopidogrel (PLAVIX) 75 mg tablet; Take 1 tablet (75 mg total) by mouth daily.  Dispense: 90 tablet; Refill: 3    3. Vitamin D deficiency  - cholecalciferol, vitamin D3, (VITAMIN D3) 1,000 unit capsule; Take 1 capsule (1,000 Units total) by mouth daily.  Dispense: 90 capsule; Refill: 3    4. Vitamin B12 deficiency (non anemic)  - cyanocobalamin 1000 MCG tablet; Take 1 tablet (1,000 mcg total) by mouth daily.  Dispense: 100 tablet; Refill: 3    5. Nonintractable epilepsy without status epilepticus, unspecified epilepsy type (H)  - levETIRAcetam (KEPPRA) 500 MG tablet; Take 1 tablet (500 mg total) by mouth 2 (two) times a day.  Dispense: 180 tablet; Refill: 3    If protocol passes may refill for 6 months if within 3 months of last provider visit (or a total of 9 months).              Passed - Hemoglobin in past 12 months     Hemoglobin   Date Value Ref Range Status   10/17/2019 13.3 12.0 - 16.0 g/dL Final             cyanocobalamin 1000 MCG tablet 100 tablet 3     Sig: Take 1 tablet (1,000 mcg total) by mouth daily.       Cyanocobalamin (Vitamin B12)  Refill Protocol Passed - 11/7/2019 10:47 AM        Passed - PCP or prescribing  provider visit in past 12 months       Last office visit with prescriber/PCP: 10/25/2019 Nguyen Jordan MD OR same dept: 10/25/2019 Nguyen Jordan MD OR same specialty: 10/25/2019 Nguyen Jordan MD Last physical: Visit date not found Last MTM visit: Visit date not found    Next visit within 3 mo: Visit date not found  Next physical within 3 mo: Visit date not found  Prescriber OR PCP: Nguyen Jordan MD  Last diagnosis associated with med order: 1. Essential hypertension  - amLODIPine (NORVASC) 10 MG tablet; Take 1 tablet (10 mg total) by mouth daily.  Dispense: 90 tablet; Refill: 3    2. Coronary artery disease involving native coronary artery of native heart with angina pectoris (H)  - aspirin 81 MG EC tablet; Take 1 tablet (81 mg total) by mouth daily.  Dispense: 90 tablet; Refill: 3  - atorvastatin (LIPITOR) 10 MG tablet; Take 1 tablet (10 mg total) by mouth at bedtime.  Dispense: 90 tablet; Refill: 3  - clopidogrel (PLAVIX) 75 mg tablet; Take 1 tablet (75 mg total) by mouth daily.  Dispense: 90 tablet; Refill: 3    3. Vitamin D deficiency  - cholecalciferol, vitamin D3, (VITAMIN D3) 1,000 unit capsule; Take 1 capsule (1,000 Units total) by mouth daily.  Dispense: 90 capsule; Refill: 3    4. Vitamin B12 deficiency (non anemic)  - cyanocobalamin 1000 MCG tablet; Take 1 tablet (1,000 mcg total) by mouth daily.  Dispense: 100 tablet; Refill: 3    5. Nonintractable epilepsy without status epilepticus, unspecified epilepsy type (H)  - levETIRAcetam (KEPPRA) 500 MG tablet; Take 1 tablet (500 mg total) by mouth 2 (two) times a day.  Dispense: 180 tablet; Refill: 3               Passed - Vitamin B12 level in last 12 months     Vitamin B-12   Date Value Ref Range Status   10/17/2019 238 213 - 816 pg/mL Final             Passed - CBC in last 12 months     WBC   Date Value Ref Range Status   10/17/2019 8.3 4.0 - 11.0 thou/uL Final     RBC   Date Value Ref Range Status   10/17/2019 4.15 3.80 - 5.40 mill/uL  Final     Hemoglobin   Date Value Ref Range Status   10/17/2019 13.3 12.0 - 16.0 g/dL Final     Hematocrit   Date Value Ref Range Status   10/17/2019 41.1 35.0 - 47.0 % Final     MCV   Date Value Ref Range Status   10/17/2019 99 80 - 100 fL Final     MCH   Date Value Ref Range Status   10/17/2019 32.0 27.0 - 34.0 pg Final     MCHC   Date Value Ref Range Status   10/17/2019 32.4 32.0 - 36.0 g/dL Final     RDW   Date Value Ref Range Status   10/17/2019 12.2 11.0 - 14.5 % Final     Platelets   Date Value Ref Range Status   10/17/2019 254 140 - 440 thou/uL Final     MPV   Date Value Ref Range Status   10/17/2019 11.2 8.5 - 12.5 fL Final                levETIRAcetam (KEPPRA) 500 MG tablet 180 tablet 3     Sig: Take 1 tablet (500 mg total) by mouth 2 (two) times a day.       Gabapentin/Levetiracetam/Tiagabine Refill Protocol  Passed - 11/7/2019 10:47 AM        Passed - PCP or prescribing provider visit in past 12 months or next 3 months     Last office visit with prescriber/PCP: 10/25/2019 Nguyen Jordan MD OR same dept: 10/25/2019 Nguyen Jordan MD OR same specialty: 10/25/2019 Nguyen Jordan MD  Last physical: Visit date not found Last MTM visit: Visit date not found   Next visit within 3 mo: Visit date not found  Next physical within 3 mo: Visit date not found  Prescriber OR PCP: Nguyen Jordan MD  Last diagnosis associated with med order: 1. Essential hypertension  - amLODIPine (NORVASC) 10 MG tablet; Take 1 tablet (10 mg total) by mouth daily.  Dispense: 90 tablet; Refill: 3    2. Coronary artery disease involving native coronary artery of native heart with angina pectoris (H)  - aspirin 81 MG EC tablet; Take 1 tablet (81 mg total) by mouth daily.  Dispense: 90 tablet; Refill: 3  - atorvastatin (LIPITOR) 10 MG tablet; Take 1 tablet (10 mg total) by mouth at bedtime.  Dispense: 90 tablet; Refill: 3  - clopidogrel (PLAVIX) 75 mg tablet; Take 1 tablet (75 mg total) by mouth daily.  Dispense: 90 tablet;  Refill: 3    3. Vitamin D deficiency  - cholecalciferol, vitamin D3, (VITAMIN D3) 1,000 unit capsule; Take 1 capsule (1,000 Units total) by mouth daily.  Dispense: 90 capsule; Refill: 3    4. Vitamin B12 deficiency (non anemic)  - cyanocobalamin 1000 MCG tablet; Take 1 tablet (1,000 mcg total) by mouth daily.  Dispense: 100 tablet; Refill: 3    5. Nonintractable epilepsy without status epilepticus, unspecified epilepsy type (H)  - levETIRAcetam (KEPPRA) 500 MG tablet; Take 1 tablet (500 mg total) by mouth 2 (two) times a day.  Dispense: 180 tablet; Refill: 3    If protocol passes may refill for 12 months if within 3 months of last provider visit (or a total of 15 months).

## 2021-06-03 NOTE — TELEPHONE ENCOUNTER
"Return call to patient who stated she has continued to ck BP up to 3x/day and concerned about one reading \"that had 3 #'s on top and 3 #'s on the bottom\".  Attempted to obtain patient's BP readings below but patient had difficulty providing specific times and whether she was checking sitting and standing BP in sequence or at different times of day.    Date Time BP-sit BP - stand   11-26-19 0800 138/85 137/89    1200 140/82 142/82   11-25-19 0730 117/85 151/93   11-24-19 1200 129/91 118/90   11-23-19 0730 143/108     1200  127/75     Attempted to reassure patient that previous BP readings provided 11-19-19 were reviewed by Dr. Schumacher and are similar to BP readings provided today which were stable - informed patient that information being relayed by phone may not be an accurate portrayal of data and suggested she schedule f/u sooner with NP or Dr. Schumacher to address concerns - patient agreed - call transferred to sched.  mg  "

## 2021-06-03 NOTE — TELEPHONE ENCOUNTER
----- Message from Santo Schumacher MD sent at 11/15/2019  7:56 AM CST -----  Hi  I think she may be benefit from seeing a neurologist with an expertise in autonomic dysfunction. [place] [facility] [place]. The cramps she describes predates the Atorvastatin.would like to review further when you see her next week.    Olya can we have you help us set up an appointment with Dr Lindsay.truman nathan

## 2021-06-03 NOTE — TELEPHONE ENCOUNTER
Left detailed message with scheduling number below for patient to call.   Encouraged to return the call with any further questions.        Bj Phelps MD, MS   EMG, Neurology, Neuromuscular  -----------------------------  [facility]   [address] [place] [id number]  ------------------------------------  Schedule by Phone  [phone]  Monday-Friday, 7AM - 7PM  Saturday from 8:30AM - 12:30PM

## 2021-06-03 NOTE — PATIENT INSTRUCTIONS - HE
Please ask the nurses to check your blood pressure sitting and standing and call my nurse with some numbers over the next 1 to 2 weeks.Stop the plavix and take 2 of the 81mg aspirin or 162mg each day.My nurse is Olya and her number is [phone]    When you check your blood pressure please sit and relax for at least 10 minutes when you check you blood pressure.Call if your blood pressure is consistently higher than 180 on the top.You can take one additional amlodipine for a blood pressure that stays over 180 on the top.check your blood pressure if its higher than 180 sit and relax and recheck in 1 to 2 hours and if still above 180 you can one additional amlodipine.Please reji my nurse if you are taking additional amlodipine.

## 2021-06-03 NOTE — TELEPHONE ENCOUNTER
Date Time Sit Stand   11/20/19 0400 132/108 152/97    0430 130/80 129/84    0500 137/87 114/77     Recommendations relayed to pt.  Pt verbalized understanding and had no other questions at this time.  Pt did report right shoulder ache and feels like needles poking her back on the right side - advised pt to discuss with PCP.  [email or web address]

## 2021-06-04 VITALS
WEIGHT: 184 LBS | DIASTOLIC BLOOD PRESSURE: 72 MMHG | SYSTOLIC BLOOD PRESSURE: 134 MMHG | OXYGEN SATURATION: 98 % | HEART RATE: 77 BPM | BODY MASS INDEX: 31.41 KG/M2 | HEIGHT: 64 IN

## 2021-06-04 VITALS
OXYGEN SATURATION: 96 % | SYSTOLIC BLOOD PRESSURE: 144 MMHG | WEIGHT: 183.08 LBS | DIASTOLIC BLOOD PRESSURE: 76 MMHG | HEIGHT: 64 IN | BODY MASS INDEX: 31.25 KG/M2 | HEART RATE: 79 BPM

## 2021-06-04 NOTE — TELEPHONE ENCOUNTER
Shantel Nurse Care Manager with [employer] called and stated patient had a sleep study on [facility] at [facility] (ph. [phone]) with Dr. Vinson. A prior auth is needed for her CPAP machine, Dr. Jordan is to call [phone] to initiate prior authorization.     Any questions call Shantel Nurse Care Manager with [employer]  Ph. Number [phone] ext [phone]    Lo Lam LPN

## 2021-06-04 NOTE — TELEPHONE ENCOUNTER
Left detailed message for Shantel, nurse  for [facility], asking her to contact Dr. Vinson's office with [facility].  They are the ones who ordered the CPAP machine.  Asked that she call with any further questions.  Shantel's ext. # [phone].  Vi MEJIA CMA/MAEVE....................9:44 AM

## 2021-06-05 VITALS
WEIGHT: 164.5 LBS | BODY MASS INDEX: 30.09 KG/M2 | HEART RATE: 80 BPM | RESPIRATION RATE: 14 BRPM | OXYGEN SATURATION: 97 % | DIASTOLIC BLOOD PRESSURE: 70 MMHG | SYSTOLIC BLOOD PRESSURE: 112 MMHG

## 2021-06-05 VITALS
OXYGEN SATURATION: 98 % | HEART RATE: 75 BPM | DIASTOLIC BLOOD PRESSURE: 68 MMHG | BODY MASS INDEX: 29.81 KG/M2 | SYSTOLIC BLOOD PRESSURE: 102 MMHG | TEMPERATURE: 97.8 F | WEIGHT: 162 LBS | HEIGHT: 62 IN

## 2021-06-05 VITALS
OXYGEN SATURATION: 99 % | SYSTOLIC BLOOD PRESSURE: 120 MMHG | WEIGHT: 172 LBS | DIASTOLIC BLOOD PRESSURE: 80 MMHG | BODY MASS INDEX: 29.37 KG/M2 | HEART RATE: 80 BPM | HEIGHT: 64 IN

## 2021-06-05 NOTE — TELEPHONE ENCOUNTER
Left detailed message for the patient relaying the following from Dr. Jordan.  Asked that she call with any further questions.  Vi MEJIA CMA/MAEVE....................1:38 PM

## 2021-06-05 NOTE — PROGRESS NOTES
Office Visit   Shira Omalley   69 y.o. female    Date of Visit: 1/13/2020    Chief Complaint   Patient presents with     Hypertension     2 month follow up     Dizziness        Assessment and Plan   1. Essential hypertension  Her blood pressure satisfactory.  I did review her outside readings and they are for the most part normal.  I reassured her that some of the numbers that are occasionally higher are not alarming and there are no signs of low numbers.  She will continue to monitor on occasion.  She is going to be due for a complete physical and will have her set up that visit in a couple of months fasting so that we can recheck her labs as well.    2. Dizziness  She has been having dizziness and has had a thorough evaluation.  She has vertigo symptoms and is working with the dizziness expert.  She has a follow-up tomorrow and I encouraged her to proceed with that follow-up.    3. Obstructive sleep apnea syndrome  She was recently diagnosed with sleep apnea.  She is using CPAP now.  She will follow-up as recommended.    4. Screen for colon cancer  She is due for colon cancer screening and will go ahead and set that up.  - Ambulatory referral for Colonoscopy       Return in about 2 months (around 3/13/2020) for Annual physical.     History of Present Illness   This 69 y.o. old female comes in to follow-up.  She has been having a lot of difficulty with dizziness.  She has been concerned about her blood pressure but we did review outside readings which were for the most part satisfactory.  I think her dizziness is more related to inner ear problems and vertigo.  I encouraged her to continue with the follow-up and evaluation with the dizziness expert.  She will continue with that.  She has had cardiac evaluation as well as an MRI that were satisfactory.  She also has a recent diagnosis of sleep apnea and now has a CPAP.  Encouraged her to continue with that and follow-up as recommended.  She has no acute  "concerns today.    Review of Systems: As above, systems otherwise reviewed and negative.     Medications, Allergies and Problem List   Patient Active Problem List   Diagnosis     Coronary artery disease involving native coronary artery of native heart with angina pectoris (H)     Dyslipidemia, goal LDL below 70     Hyperparathyroidism (H)     Obstructive sleep apnea syndrome     Essential hypertension     History of colonic polyps     Orthostatic hypotension     Nonintractable epilepsy without status epilepticus, unspecified epilepsy type (H)     Vitamin D deficiency     Vitamin B12 deficiency (non anemic)     Current Outpatient Medications   Medication Sig Dispense Refill     amLODIPine (NORVASC) 10 MG tablet Take 1 tablet (10 mg total) by mouth daily. 90 tablet 3     aspirin 81 MG EC tablet Take 1 tablet (81 mg total) by mouth daily. 90 tablet 1     atorvastatin (LIPITOR) 10 MG tablet Take 1 tablet (10 mg total) by mouth at bedtime. 90 tablet 3     cholecalciferol, vitamin D3, (VITAMIN D3) 1,000 unit capsule Take 1 capsule (1,000 Units total) by mouth daily. 90 capsule 3     cyanocobalamin 1000 MCG tablet Take 1 tablet (1,000 mcg total) by mouth daily. 90 tablet 3     levETIRAcetam (KEPPRA) 500 MG tablet Take 1 tablet (500 mg total) by mouth 2 (two) times a day. 180 tablet 3     albuterol (PROAIR HFA;PROVENTIL HFA;VENTOLIN HFA) 90 mcg/actuation inhaler Inhale 2 puffs every 6 (six) hours as needed for wheezing or shortness of breath. 8.5 g 3     nitroglycerin (NITROSTAT) 0.4 MG SL tablet Place 1 tablet (0.4 mg total) under the tongue every 5 (five) minutes as needed. 50 tablet 2     No current facility-administered medications for this visit.      Allergies   Allergen Reactions     Contrast [Iohexol] Itching     Iodinated Casein      Iodinated Contrast Media Itching     Premedicated prior to CT scans and has no problems     Penicillins           Physical Exam     /72   Pulse 77   Ht 5' 4.25\" (1.632 m)   Wt " 184 lb (83.5 kg)   SpO2 98%   BMI 31.34 kg/m      General: This is an alert female in no apparent distress.     Additional Information   Social History     Tobacco Use     Smoking status: Former Smoker     Packs/day: 0.00     Smokeless tobacco: Never Used   Substance Use Topics     Alcohol use: Never     Frequency: Never     Drug use: Never          Nguyen Jordan MD

## 2021-06-05 NOTE — TELEPHONE ENCOUNTER
Patient calling  She is reporting that she has been waking up in the middle of the night most recently with cramping that comes all the way up from her feet to her buttocks, while she is in bed.  She states she used to take Quinine pills  for these cramps in her feet, but now, they are traveling all the way up her legs to her buttocks.  She reports she recently started using a CPAP machine, and is wondering if this is causing the terrible cramps she is getting.      She is asking for medication  Patient was advised to see MD, and an appointment was made   For mondayAM. With Dr. Jordan.  Patient c/o having ,many different appointments, and needs a ride through [employer] in order to get to office, and she must order the rid days ahead of time.    DEONNA Graves RN  [facility] Triage/refill nurse                .

## 2021-06-05 NOTE — TELEPHONE ENCOUNTER
I recommend she make sure she is staying well hydrated as cramps can be due to dehydration.  We do not prescribe quinine any longer for cramps as it can cause some heart rhythm abnormalities.  We can check her labs and electrolytes when she comes in.  Thanks.

## 2021-06-05 NOTE — PROGRESS NOTES
Office Visit   Shira Omalley   69 y.o. female    Date of Visit: 2/6/2020    Chief Complaint   Patient presents with     Leg Pain     Bilateral. Starts at ankles moves into thighs         Assessment and Plan   1. Muscle cramp  She has had some muscle cramps over the years but they were much worse recently.  I will go ahead and recheck her blood work.  She has been low in vitamin D and B12 in the past.  She also has a history of hypertension and we will check her electrolytes and creatinine.  She is on a statin medication and will check her CK.  I will get back to her with the results.  In the meantime recommend that she continue to work on fluid intake to avoid dehydration.  She is in agreement with this plan.  - HM2(CBC w/o Differential)  - Comprehensive Metabolic Panel  - Erythrocyte Sedimentation Rate  - CK Total    2. Vitamin D deficiency  - Vitamin D, Total (25-Hydroxy)    3. Vitamin B12 deficiency (non anemic)  - Vitamin B12    4. Essential hypertension  Blood pressure is a little bit borderline today but it is been pretty well controlled recently.       Return for Next scheduled follow up.     History of Present Illness   This 69 y.o. old female comes in due to worsening muscle cramps.  She has a history of hypertension and is on medication.  Also has a history of heart disease and is on a statin medication.  She has been deficient in vitamin D and B12 in the past but states that she has been taking the supplements.  Over the weekend she developed worsening muscle cramps.  She is used to getting them in the lower leg but they came up into her thighs.  It happened 1 night.  She has not had any recurrence of that.  States that she does drink a lot of fluids.  Has not had any other new symptoms.  No fevers or chills or symptoms of infection.    Review of Systems: As above, systems otherwise reviewed and negative.     Medications, Allergies and Problem List   Patient Active Problem List   Diagnosis      "Coronary artery disease involving native coronary artery of native heart with angina pectoris (H)     Dyslipidemia, goal LDL below 70     Hyperparathyroidism (H)     Obstructive sleep apnea syndrome     Essential hypertension     History of colonic polyps     Orthostatic hypotension     Nonintractable epilepsy without status epilepticus, unspecified epilepsy type (H)     Vitamin D deficiency     Vitamin B12 deficiency (non anemic)     Current Outpatient Medications   Medication Sig Dispense Refill     albuterol (PROAIR HFA;PROVENTIL HFA;VENTOLIN HFA) 90 mcg/actuation inhaler Inhale 2 puffs every 6 (six) hours as needed for wheezing or shortness of breath. 8.5 g 3     amLODIPine (NORVASC) 10 MG tablet Take 1 tablet (10 mg total) by mouth daily. 90 tablet 3     aspirin 81 MG EC tablet Take 1 tablet (81 mg total) by mouth daily. 90 tablet 1     atorvastatin (LIPITOR) 10 MG tablet Take 1 tablet (10 mg total) by mouth at bedtime. 90 tablet 3     cholecalciferol, vitamin D3, (VITAMIN D3) 1,000 unit capsule Take 1 capsule (1,000 Units total) by mouth daily. 90 capsule 3     cyanocobalamin 1000 MCG tablet Take 1 tablet (1,000 mcg total) by mouth daily. 90 tablet 3     levETIRAcetam (KEPPRA) 500 MG tablet Take 1 tablet (500 mg total) by mouth 2 (two) times a day. 180 tablet 3     nitroglycerin (NITROSTAT) 0.4 MG SL tablet Place 1 tablet (0.4 mg total) under the tongue every 5 (five) minutes as needed. 50 tablet 2     No current facility-administered medications for this visit.      Allergies   Allergen Reactions     Contrast [Iohexol] Itching     Iodinated Casein      Iodinated Contrast Media Itching     Premedicated prior to CT scans and has no problems     Penicillins           Physical Exam     /76 (Patient Site: Left Arm, Patient Position: Sitting, Cuff Size: Adult Regular)   Pulse 79   Ht 5' 4.25\" (1.632 m)   Wt 183 lb 1.3 oz (83 kg)   SpO2 96%   Breastfeeding No   BMI 31.18 kg/m      General: This is an " alert female in no apparent distress.     Additional Information   Social History     Tobacco Use     Smoking status: Former Smoker     Packs/day: 0.00     Smokeless tobacco: Never Used   Substance Use Topics     Alcohol use: Never     Frequency: Never     Drug use: Never            Nguyen Jordan MD

## 2021-06-05 NOTE — PROGRESS NOTES
Out patient Sleep Deprived state EEG was performed that included photic stimulation and 3 minutes of hyperventilation. The patient was both awake and asleep during the recording.  EEG#   EEG [id number] system used.

## 2021-06-07 NOTE — PATIENT INSTRUCTIONS - HE
It was nice to visit with you by telephone today.  Please let me know if you are having any increased symptoms of chest discomfort or use of nitroglycerin.  Please continue to monitor your blood pressure.  Please call my nurse today at [phone] with any heart related questions or concerns.  I did send a note to your primary care physician regarding the concern she had related to the sleep apnea issues.

## 2021-06-07 NOTE — PROGRESS NOTES
"Shira Omalley is a 69 y.o. female who is being evaluated via a billable telephone visit.      The patient has been notified of following:     \"This telephone visit will be conducted via a call between you and your physician/provider. We have found that certain health care needs can be provided without the need for a physical exam.  This service lets us provide the care you need with a short phone conversation.  If a prescription is necessary we can send it directly to your pharmacy.  If lab work is needed we can place an order for that and you can then stop by our lab to have the test done at a later time.    Telephone visits are billed at different rates depending on your insurance coverage. During this emergency period, for some insurers they may be billed the same as an in-person visit.  Please reach out to your insurance provider with any questions.    If during the course of the call the physician/provider feels a telephone visit is not appropriate, you will not be charged for this service.\"    Patient has given verbal consent to a Telephone visit? Yes      Telephone Visit   Shira Omalley   69 y.o. female    Date of Visit: 4/17/2020    Chief Complaint   Patient presents with     Mass     Right upper thigh, no pain for 1 month        Assessment and Plan   1. Skin lesion  She has what sounds like a cyst in the inguinal area.  It was enlarged earlier this week but she states that now it is better.  There is no longer any fluid or drainage from it.  Is not painful.  Recommend that she continue to just monitor it and if it starts to worsen again she will let me know.  She is in agreement with this plan.         No follow-ups on file.     History of Present Illness   This 69 y.o. old female was evaluated with a telephone visit today.  She called in for this visit about 3 days ago due to a cyst-like skin lesion in the groin area.  She states that it was fluid-filled and enlarging when she called.  Today " she states it is actually improved.  The fluid is no longer noticeable and is getting smaller.  Is not painful and its not red to touch.  She has no other acute concerns and has been doing well otherwise.    Review of Systems: As above, systems otherwise reviewed and negative.     Medications, Allergies and Problem List   Patient Active Problem List   Diagnosis     Coronary artery disease involving native coronary artery of native heart with angina pectoris (H)     Dyslipidemia, goal LDL below 70     Hyperparathyroidism (H)     Obstructive sleep apnea syndrome     Essential hypertension     History of colonic polyps     Orthostatic hypotension     Nonintractable epilepsy without status epilepticus, unspecified epilepsy type (H)     Vitamin D deficiency     Vitamin B12 deficiency (non anemic)     Current Outpatient Medications   Medication Sig Dispense Refill     amLODIPine (NORVASC) 10 MG tablet Take 1 tablet (10 mg total) by mouth daily. 90 tablet 3     aspirin 81 MG EC tablet Take 1 tablet (81 mg total) by mouth daily. 90 tablet 1     atorvastatin (LIPITOR) 10 MG tablet Take 1 tablet (10 mg total) by mouth at bedtime. 90 tablet 3     cholecalciferol, vitamin D3, (VITAMIN D3) 1,000 unit capsule Take 1 capsule (1,000 Units total) by mouth daily. 90 capsule 3     cyanocobalamin 1000 MCG tablet Take 1 tablet (1,000 mcg total) by mouth daily. 90 tablet 3     levETIRAcetam (KEPPRA) 500 MG tablet Take 1 tablet (500 mg total) by mouth 2 (two) times a day. 180 tablet 3     nitroglycerin (NITROSTAT) 0.4 MG SL tablet Place 1 tablet (0.4 mg total) under the tongue every 5 (five) minutes as needed. 50 tablet 2     albuterol (PROAIR HFA;PROVENTIL HFA;VENTOLIN HFA) 90 mcg/actuation inhaler Inhale 2 puffs every 6 (six) hours as needed for wheezing or shortness of breath. 8.5 g 3     No current facility-administered medications for this visit.      Allergies   Allergen Reactions     Contrast [Iohexol] Itching     Iodinated Casein       Iodinated Contrast Media Itching     Premedicated prior to CT scans and has no problems     Penicillins           Physical Exam     There were no vitals taken for this visit.    During our discussion there was no evidence of shortness of breath.     Additional Information   Social History     Tobacco Use     Smoking status: Former Smoker     Packs/day: 0.00     Smokeless tobacco: Never Used   Substance Use Topics     Alcohol use: Never     Frequency: Never     Drug use: Never            Nguyen Jordan MD      Phone call duration:  3 minutes    Sugey Montgomery CMA vi

## 2021-06-07 NOTE — PROGRESS NOTES
Review of Systems - History obtained from the patient  General ROS: negative  Psychological ROS: negative  Ophthalmic ROS: negative  ENT ROS: positive for - hearing loss  Hematological and Lymphatic ROS: positive for - easy bruising  Respiratory ROS: positive for - shortness of breath  Cardiovascular ROS: positive for - chest pain, irregular heartbeat and shortness of breath  Gastrointestinal ROS: negative  Genito-Urinary ROS: positive for - frequent urination at night  Musculoskeletal ROS: positive for - joint pain and muscle pain  Neurological ROS: positive for - dizziness, loss of balance, and daytime sleepiness  Dermatological ROS: negative

## 2021-06-12 NOTE — TELEPHONE ENCOUNTER
"Hurt her back lifting a box up the 3 flights of stairs last week/ pain around the middle of the back/ there is a knot on the left side/rates as a \"10\"/ sent to scheduling   LUKE Jacobs    Reason for Disposition    SEVERE back pain (e.g., excruciating, unable to do any normal activities) and not improved after pain medicine and CARE ADVICE    Additional Information    Negative: Dangerous mechanism of injury (e.g., MVA, contact sports, trampoline, diving, fall > 10 feet or 3 meters) (EXCEPTION: back pain began > 1 hour after injury)    Negative: Weakness (i.e., paralysis, loss of muscle strength) of the leg(s) or foot and sudden onset after back injury    Negative: Numbness (i.e., loss of sensation) of the leg(s) or foot and sudden onset after back injury    Negative: Major bleeding (actively dripping or spurting) that can't be stopped    Negative: Bullet, knife or other serious penetrating wound    Negative: Shock suspected (e.g., cold/pale/clammy skin, too weak to stand, low BP, rapid pulse)    Negative: Sounds like a life-threatening emergency to the triager    Negative: Back pain not from an injury    Back pain from overuse (work, exercise, gardening) OR from twisting, lifting, or bending injury    Negative: Passed out (i.e., fainted, collapsed and was not responding)    Negative: Shock suspected (e.g., cold/pale/clammy skin, too weak to stand, low BP, rapid pulse)    Negative: Sounds like a life-threatening emergency to the triager    Negative: Major injury to the back (e.g., MVA, fall > 10 feet or 3 meters, penetrating injury, etc.)    Negative: Pain in the upper back over the ribs (rib cage) that radiates (travels) into the chest    Negative: Pain in the upper back over the ribs (rib cage) and worsened by coughing (or clearly increases with breathing)    Negative: SEVERE back pain of sudden onset and age > 60    Negative: SEVERE abdominal pain (e.g., excruciating)    Negative: Abdominal pain and age > 60    " Negative: Unable to urinate (or only a few drops) and bladder feels very full    Negative: Loss of bladder or bowel control (urine or bowel incontinence; wetting self, leaking stool) of new onset    Negative: Numbness (loss of sensation) in groin or rectal area    Negative: Pain radiates into groin, scrotum    Negative: Blood in urine (red, pink, or tea-colored)    Negative: Vomiting and pain over lower ribs of back (i.e., flank - kidney area)    Negative: Weakness of a leg or foot (e.g., unable to bear weight, dragging foot)    Negative: Patient sounds very sick or weak to the triager    Negative: Fever > 100.5 F (38.1 C) and flank pain    Negative: Pain or burning with passing urine (urination)    Protocols used: BACK PAIN-A-OH, BACK INJURY-A-OH

## 2021-06-12 NOTE — PROGRESS NOTES
Office Visit   Shira Omalley   69 y.o. female    Date of Visit: 10/8/2020    Chief Complaint   Patient presents with     Back Pain     For 2 weeks, lifted a box 2 weeks ago        Assessment and Plan   1. Obstructive sleep apnea syndrome  I think she has had some miscommunication on what to do about her sleep apnea.  Currently she is not using her CPAP.  She states that she tries to stay up all night because she was told that she stops breathing.  I am going to refer her back to the sleep clinic so that she can get back on CPAP and have a better understanding of what she should be doing.  She is in agreement with this plan.  - Ambulatory referral to Sleep Medicine    2. Acute midline low back pain without sciatica  Her back pain is improving.  She does not have any radicular symptoms with it.  She is going to continue to monitor and if it worsens or does not continue to improve then I would have her see physical therapy.         Return in about 4 weeks (around 11/5/2020) for Routine preventive.     History of Present Illness   This 69 y.o. old female comes in due to back pain.  Over the last couple of weeks she has had low back pain.  She feels that it is improving.  She has not had any neurologic symptoms with it.  It came on after lifting a box all the way up 3 flights of stairs.  Again it is improving.  She does have some questions on her sleep apnea.  She states that when she last talked to the sleep specialist in April she was told to no longer use her CPAP and that she should not sleep at night because she stopped breathing.  I did advise her that that is what sleep apnea is and that she does need to be on CPAP.  I am going to refer her back to the sleep clinic to discuss next steps and hopefully clear up her misconceptions.  She has no other acute concerns today.    Review of Systems: As above, systems otherwise reviewed and negative.     Medications, Allergies and Problem List   Patient Active  "Problem List   Diagnosis     Coronary artery disease involving native coronary artery of native heart with angina pectoris (H)     Dyslipidemia, goal LDL below 70     Hyperparathyroidism (H)     Obstructive sleep apnea syndrome     Essential hypertension     History of colonic polyps     Orthostatic hypotension     Nonintractable epilepsy without status epilepticus, unspecified epilepsy type (H)     Vitamin D deficiency     Vitamin B12 deficiency (non anemic)     Current Outpatient Medications   Medication Sig Dispense Refill     albuterol (PROAIR HFA;PROVENTIL HFA;VENTOLIN HFA) 90 mcg/actuation inhaler Inhale 2 puffs every 6 (six) hours as needed for wheezing or shortness of breath. 8.5 g 3     amLODIPine (NORVASC) 10 MG tablet Take 1 tablet (10 mg total) by mouth daily. 90 tablet 3     aspirin 81 MG EC tablet Take 1 tablet (81 mg total) by mouth daily. 90 tablet 1     atorvastatin (LIPITOR) 10 MG tablet Take 1 tablet (10 mg total) by mouth at bedtime. 90 tablet 3     cholecalciferol, vitamin D3, (VITAMIN D3) 1,000 unit capsule Take 1 capsule (1,000 Units total) by mouth daily. 90 capsule 3     cyanocobalamin 1000 MCG tablet Take 1 tablet (1,000 mcg total) by mouth daily. 90 tablet 3     levETIRAcetam (KEPPRA) 500 MG tablet Take 1 tablet (500 mg total) by mouth 2 (two) times a day. 180 tablet 3     nitroglycerin (NITROSTAT) 0.4 MG SL tablet Place 1 tablet (0.4 mg total) under the tongue every 5 (five) minutes as needed. 50 tablet 2     No current facility-administered medications for this visit.      Allergies   Allergen Reactions     Contrast [Iohexol] Itching     Iodinated Casein      Iodinated Contrast Media Itching     Premedicated prior to CT scans and has no problems     Penicillins           Physical Exam     /80 (Patient Site: Right Arm, Patient Position: Sitting)   Pulse 80   Ht 5' 4.25\" (1.632 m)   Wt 172 lb (78 kg)   SpO2 99%   BMI 29.29 kg/m      General: This is an alert female, is able to " get up without difficulty from the chair.  Walks without any obvious signs of pain.     Additional Information   Social History     Tobacco Use     Smoking status: Former Smoker     Packs/day: 0.00     Smokeless tobacco: Never Used   Substance Use Topics     Alcohol use: Never     Frequency: Never     Drug use: Never          Nguyen Jordan MD

## 2021-06-13 NOTE — TELEPHONE ENCOUNTER
Discussed with granddaughter Sherly - they would like a letter from Dr Schumacher sent to  stating concerns and that they would like pt to be let from her lease to find more appropriate housing.    Mgr Marie fax [phone], email [email or web address].    Dr Schumacher - are you able to make letter stating your concerns, and then I can fax it to the .  -crystal

## 2021-06-13 NOTE — TELEPHONE ENCOUNTER
I think is reasonable to write that she has the ability to ambulate related to a number of conditions including cardiac condition and orthostatic hypotension.  How do I write this so that it is in the chart record in addition to sending it as an email.?  mdg

## 2021-06-13 NOTE — PROGRESS NOTES
Assessment and Plan:     1. Wellness examination  Her examination is satisfactory today.  She has had a mastectomy and does not need mammograms.  She has had a hysterectomy.  She is due for colon cancer screening and we will set that up.  She is due for a flu shot and pneumonia shot and will give those today.  She is fasting and we will go ahead and recheck her lipids, metabolic panel and thyroid.  Blood pressure is well controlled.  She has not had any recent cardiac symptoms.  She has a history of vitamin deficiencies and we will check her labs today.  We did review the documentation for her wellness visit.  She has no problems with activity of daily living and no falls.  She has an advanced directive.  Memory screening is satisfactory.    2. Coronary artery disease involving native coronary artery of native heart with angina pectoris (H)  No recent cardiac symptoms.  We will check her lipids and her blood pressure is well controlled.  - Lipid Cascade    3. Essential hypertension  - HM2(CBC w/o Differential)  - Thyroid Mayes  - Comprehensive Metabolic Panel    4. Vitamin D deficiency  - Vitamin D, Total (25-Hydroxy)    5. Vitamin B12 deficiency (non anemic)  - Vitamin B12    6. Screen for colon cancer  - Ambulatory referral for Colonoscopy    7. Need for hepatitis C screening test  - Hepatitis C Antibody (Anti-HCV)    8. Needs flu shot  - Influenza,Quad,High Dose,PF 65 YR+      The patient's current medical problems were reviewed.    I have had an Advance Directives discussion with the patient.  The following health maintenance schedule was reviewed with the patient and provided in printed form in the after visit summary:   Health Maintenance   Topic Date Due     HEPATITIS C SCREENING  1950     TD 18+ HE  11/06/1966     COLORECTAL CANCER SCREENING  11/06/1966     ZOSTER VACCINES (1 of 2) 11/06/1998     Pneumococcal Vaccine: 65+ Years (1 of 1 - PPSV23) 11/06/2013     DXA SCAN  06/01/2020     INFLUENZA  VACCINE RULE BASED (1) 08/01/2020     MEDICARE ANNUAL WELLNESS VISIT  11/17/2021     FALL RISK ASSESSMENT  11/17/2021     LIPID  10/17/2024     ADVANCE CARE PLANNING  11/17/2025     Pneumococcal Vaccine: Pediatrics (0 to 5 Years) and At-Risk Patients (6 to 64 Years)  Aged Out     HEPATITIS B VACCINES  Aged Out     MAMMOGRAM  Discontinued        Subjective:   Chief Complaint: Shira Omalley is an 70 y.o. female here for an Annual Wellness visit.   HPI: She is here for a physical exam.  She has a history of coronary artery disease and hypertension.  She has not had any recent chest pain or palpitations.  Risk factors have been well controlled.  She is fasting today and will plan to recheck all of her labs.  We did review age-appropriate health maintenance and she is due for a flu shot and pneumonia shot.  She is also due for colon cancer screening.  We reviewed the documentation for her wellness visit and she has no problems with activities of daily living and no recent falls.  She has an advanced directive.  Memory screening is normal.    Review of Systems:   Please see above.  The rest of the review of systems are negative for all systems.    Patient Care Team:  Nguyen Jordan MD as PCP - General (Internal Medicine)  Nguyen Jordan MD as Assigned PCP  Santo Schumacher MD as Assigned Heart and Vascular Provider     Patient Active Problem List   Diagnosis     Coronary artery disease involving native coronary artery of native heart with angina pectoris (H)     Dyslipidemia, goal LDL below 70     Hyperparathyroidism (H)     Obstructive sleep apnea syndrome     Essential hypertension     History of colonic polyps     Orthostatic hypotension     Nonintractable epilepsy without status epilepticus, unspecified epilepsy type (H)     Vitamin D deficiency     Vitamin B12 deficiency (non anemic)     Past Medical History:   Diagnosis Date     CAD (coronary artery disease)     2 stents placed - 2011     Cancer,  uterine (H) 7/22/2019     Chronic kidney disease, stage 3 (moderate) 7/22/2019     HX: breast cancer 7/22/2019     Hypertension      Nonintractable epilepsy without status epilepticus, unspecified epilepsy type (H)      Ovarian cancer (H)      Personal history of other malignant neoplasm of bronchus and lung 7/22/2019     Seizure disorder (H) 7/22/2019     Syncope and collapse 10/2/2019      Past Surgical History:   Procedure Laterality Date     HYSTERECTOMY            LUNG LOBECTOMY       MASTECTOMY Bilateral      MASTECTOMY MODIFIED RADICAL Bilateral      PARATHYROID GLAND SURGERY       PARTIAL NEPHRECTOMY        Family History   Problem Relation Age of Onset     Heart attack Mother      Heart attack Father      Heart attack Brother      Heart attack Other       Social History     Socioeconomic History     Marital status:      Spouse name: Not on file     Number of children: 2     Years of education: Not on file     Highest education level: Not on file   Occupational History     Occupation: reitred   Social Needs     Financial resource strain: Not on file     Food insecurity     Worry: Not on file     Inability: Not on file     Transportation needs     Medical: Not on file     Non-medical: Not on file   Tobacco Use     Smoking status: Former Smoker     Packs/day: 0.00     Smokeless tobacco: Never Used   Substance and Sexual Activity     Alcohol use: Never     Frequency: Never     Drug use: Never     Sexual activity: Not Currently   Lifestyle     Physical activity     Days per week: Not on file     Minutes per session: Not on file     Stress: Not on file   Relationships     Social connections     Talks on phone: Not on file     Gets together: Not on file     Attends Mu-ism service: Not on file     Active member of club or organization: Not on file     Attends meetings of clubs or organizations: Not on file     Relationship status: Not on file     Intimate partner violence     Fear of current or ex  "partner: Not on file     Emotionally abused: Not on file     Physically abused: Not on file     Forced sexual activity: Not on file   Other Topics Concern     Not on file   Social History Narrative     Not on file      Current Outpatient Medications   Medication Sig Dispense Refill     amLODIPine (NORVASC) 10 MG tablet Take 1 tablet (10 mg total) by mouth daily. 90 tablet 3     aspirin 81 MG EC tablet Take 1 tablet (81 mg total) by mouth daily. 90 tablet 1     atorvastatin (LIPITOR) 10 MG tablet Take 1 tablet (10 mg total) by mouth at bedtime. 90 tablet 3     cholecalciferol, vitamin D3, (VITAMIN D3) 1,000 unit capsule Take 1 capsule (1,000 Units total) by mouth daily. 90 capsule 3     cyanocobalamin 1000 MCG tablet Take 1 tablet (1,000 mcg total) by mouth daily. 90 tablet 3     levETIRAcetam (KEPPRA) 500 MG tablet Take 1 tablet (500 mg total) by mouth 2 (two) times a day. 180 tablet 3     nitroglycerin (NITROSTAT) 0.4 MG SL tablet Place 1 tablet (0.4 mg total) under the tongue every 5 (five) minutes as needed. 50 tablet 2     No current facility-administered medications for this visit.       Objective:   Vital Signs:   Visit Vitals  /68 (Patient Site: Left Arm, Patient Position: Sitting)   Pulse 75   Temp 97.8  F (36.6  C)   Ht 5' 2\" (1.575 m)   Wt 162 lb (73.5 kg)   SpO2 98%   BMI 29.63 kg/m           VisionScreening:  No exam data present     PHYSICAL EXAM  General:  Patient is alert and in no apparent distress.  Neck:  Supple with no adenopathy or thyroid abnormality noted.  Cardiovascular:  Regular rate and rhythm, normal S1/S2, 2 out of 6 systolic murmur heard at the base.  Pulmonary:  Lungs are clear to auscultation bilaterally with normal respiratory effort.  Gastrointestinal:  Abdomen is soft, non-tender, non-distended, with no organomegaly, rebound or guarding.  Extremities:  No joint abnormalities with no LE edema.  Strong dorsalis pedis pulses.  Neurologic Cranial nerves are intact.  No focal " deficits.  Psychiatric:  Pleasant, no confusion or agitation   Skin:  Warm and well perfused with no rashes or abnormalities.  Chest wall: No masses or abnormalities are noted.  Genital: Normal external genitalia and normal hair distribution.  Vaginal wall is normal.  Bimanual exam with no masses or abnormalities noted.      Assessment Results 11/17/2020   Activities of Daily Living No help needed   Instrumental Activities of Daily Living No help needed   Mini Cog Total Score 4   Some recent data might be hidden     A Mini-Cog score of 0-2 suggests the possibility of dementia, score of 3-5 suggests no dementia        Identified Health Risks:     The patient was provided with written information regarding signs of hearing loss.

## 2021-06-13 NOTE — TELEPHONE ENCOUNTER
To whom it may concern,  I am writing at the request of Shira Omalley regarding her cardiovascular status and difficulty walking distances secondary to shortness of breath and dizziness and risk of falling and hurting herself.  Patient is in need of more appropriate housing than her current living situation provides and I am hoping that you will consider letting her out of her lease so that she can find living situation that is better suited for her heart condition.                                                                                      Thank you for your attention,                                                                                                  Santo Schumacher MD.

## 2021-06-13 NOTE — TELEPHONE ENCOUNTER
Informed Nabila that letter was faxed to [facility] on Thursday 12/10/20.  Nabila will f/u with [facility].  -crystal

## 2021-06-13 NOTE — PATIENT INSTRUCTIONS - HE
Please call my nurse Olya with heart related questions and call with housing person so I can send a letter.We talked about wearing support stockings during the day and keeping hydrated and trying to reconnect with Dr Prince.Her number I [phone]
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
## 2021-06-15 NOTE — TELEPHONE ENCOUNTER
RN cannot approve Refill Request    RN can NOT refill this medication ALLERGY CONTRAINDICATION, routing to provider. Patient is out of medication.. Last office visit: Visit date not found Last Physical: Visit date not found Last MTM visit: Visit date not found Last visit same specialty: Visit date not found.  Next visit within 3 mo: Visit date not found  Next physical within 3 mo: Visit date not found      Ludmila Gu, [facility] Triage/Med Refill 2/15/2021    Requested Prescriptions   Pending Prescriptions Disp Refills     nitroglycerin (NITROSTAT) 0.4 MG SL tablet 50 tablet 2     Sig: Place 1 tablet (0.4 mg total) under the tongue every 5 (five) minutes as needed.       Nitroglycerin Refill Protocol Passed - 2/15/2021  1:46 PM        Passed - Visit with PCP or prescribing provider visit in last 6 months or next 3 months     Last office visit with prescriber/PCP: Visit date not found OR same dept: Visit date not found OR same specialty: Visit date not found Last physical: Visit date not found Last MTM visit: Visit date not found     Next appt within 3 mo: Visit date not found  Next physical within 3 mo: Visit date not found  Prescriber OR PCP: Abeba Rosado RN  Last diagnosis associated with med order: 1. Coronary artery disease involving native heart without angina pectoris, unspecified vessel or lesion type  - nitroglycerin (NITROSTAT) 0.4 MG SL tablet; Place 1 tablet (0.4 mg total) under the tongue every 5 (five) minutes as needed.  Dispense: 50 tablet; Refill: 2    If protocol passes may refill for 6 months if within 3 months of last provider visit (or a total of 9 months).
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
## 2021-06-15 NOTE — TELEPHONE ENCOUNTER
Called pt and directed to [facility] on [facility] per Dr Jordan.

## 2021-06-15 NOTE — TELEPHONE ENCOUNTER
Patient requesting refill of nitroglycerin 0.4mg tablet be sent to [facility] at [address] [place] [address] in [place]; she is completely out of medication.    Abeba Rosado RN  [facility] T[facility]    Please close encounter when completed.

## 2021-06-16 NOTE — TELEPHONE ENCOUNTER
Telephone Encounter by Meg Moreno LPN at 10/23/2019  1:48 PM     Author: Meg Moreno LPN Service: -- Author Type: Licensed Nurse    Filed: 10/23/2019  1:49 PM Encounter Date: 10/23/2019 Status: Signed    : Meg Moreno LPN (Licensed Nurse)       LMOM for Sheba giving okay for below orders as requested.     Meg GERBER LPN .......... 1:48 PM  10/23/19        Omi Stephens MD  You 20 minutes ago (1:26 PM)      Ok     Omi Stephens MD   Internal medicine   [facility]   [phone]   [email or web address]    Routing comment

## 2021-06-16 NOTE — TELEPHONE ENCOUNTER
Telephone Encounter by Meg Moreno LPN at 10/23/2019  1:52 PM     Author: Meg Moreno LPN Service: -- Author Type: Licensed Nurse    Filed: 10/23/2019  1:53 PM Encounter Date: 10/23/2019 Status: Signed    : Meg Moreno LPN (Licensed Nurse)       LMOM asking if Sheab can have patients BP rechecked due to it being out of normal range for her.    WANG Araujo also.    Meg GERBER LPN .......... 1:53 PM  10/23/19      Omi Stephens MD  You 22 minutes ago (1:27 PM)      Please recheck blood pressure.  Clearly different than her past readings    BP Readings from Last 9 Encounters:   10/04/19 : 152/87   10/02/19 : 160/90   08/26/19 : 124/88   07/22/19 : 138/90   12/31/18 : 124/59   10/05/16 : 137/64    Routing comment

## 2021-06-18 NOTE — PATIENT INSTRUCTIONS - HE
Patient Instructions by Nguyen Jordan MD at 11/17/2020  9:40 AM     Author: Nguyen Jordan MD Service: -- Author Type: Physician    Filed: 11/17/2020  9:56 AM Encounter Date: 11/17/2020 Status: Signed    : Nguyen Jordan MD (Physician)         Patient Education   Signs of Hearing Loss  Hearing loss is a problem shared by many people. In fact, it is one of the most common health conditions, particularly as people age. Most people over age 65 have some hearing loss, and by age 80, almost everyone does. Because hearing loss usually occurs slowly over the years, you may not realize your hearing ability has gotten worse.       Have your hearing checked  Contact your [facility] care provider if you:    Have to strain to hear normal conversation.    Have to watch other peoples faces very carefully to follow what theyre saying.    Need to ask people to repeat what theyve said.    Often misunderstand what people are saying.    Turn the volume of the television or radio up so high that others complain.    Feel that people are mumbling when theyre talking to you.    Find that the effort to hear leaves you feeling tired and irritated.    Notice, when using the phone, that you hear better with 1 ear than the other.    1318-2055 [employer] [address] [place] [address], [place] [address]. All rights reserved. This information is not intended as a substitute for professional medical care. Always follow your healthcare professional's instructions.         Patient Education   Personalized Prevention Plan  You are due for the preventive services outlined below.  Your care team is available to assist you in scheduling these services.  If you have already completed any of these items, please share that information with your care team to update in your medical record.  Health Maintenance   Topic Date Due   ? HEPATITIS C SCREENING  1950   ? TD 18+ HE  11/06/1966   ? COLORECTAL CANCER SCREENING  11/06/1966   ?  ZOSTER VACCINES (1 of 2) 11/06/1998   ? Pneumococcal Vaccine: 65+ Years (1 of 1 - PPSV23) 11/06/2013   ? DXA SCAN  06/01/2020   ? INFLUENZA VACCINE RULE BASED (1) 08/01/2020   ? MEDICARE ANNUAL WELLNESS VISIT  11/17/2021   ? FALL RISK ASSESSMENT  11/17/2021   ? LIPID  10/17/2024   ? ADVANCE CARE PLANNING  11/17/2025   ? Pneumococcal Vaccine: Pediatrics (0 to 5 Years) and At-Risk Patients (6 to 64 Years)  Aged Out   ? HEPATITIS B VACCINES  Aged Out   ? MAMMOGRAM  Discontinued

## 2021-06-20 NOTE — LETTER
Letter by Nguyen Jordan MD at      Author: Nguyen Jordan MD Service: -- Author Type: --    Filed:  Encounter Date: 2/7/2020 Status: (Other)         Shira Omalley  [address]  [place] [address]             February 7, 2020         Dear Ms. Omalley,    Below are the results from your recent visit:    Resulted Orders   HM2(CBC w/o Differential)   Result Value Ref Range    WBC 9.5 4.0 - 11.0 thou/uL    RBC 4.21 3.80 - 5.40 mill/uL    Hemoglobin 13.9 12.0 - 16.0 g/dL    Hematocrit 40.9 35.0 - 47.0 %    MCV 97 80 - 100 fL    MCH 33.0 27.0 - 34.0 pg    MCHC 34.0 32.0 - 36.0 g/dL    RDW 10.5 (L) 11.0 - 14.5 %    Platelets 271 140 - 440 thou/uL    MPV 9.1 7.0 - 10.0 fL   Comprehensive Metabolic Panel   Result Value Ref Range    Sodium 140 136 - 145 mmol/L    Potassium 3.8 3.5 - 5.0 mmol/L    Chloride 104 98 - 107 mmol/L    CO2 23 22 - 31 mmol/L    Anion Gap, Calculation 13 5 - 18 mmol/L    Glucose 166 (H) 70 - 125 mg/dL    BUN 24 8 - 28 mg/dL    Creatinine 1.42 (H) 0.60 - 1.10 mg/dL    GFR MDRD Af Amer 44 (L) >60 mL/min/1.73m2    GFR MDRD Non Af Amer 36 (L) >60 mL/min/1.73m2    Bilirubin, Total 0.5 0.0 - 1.0 mg/dL    Calcium 9.5 8.5 - 10.5 mg/dL    Protein, Total 7.3 6.0 - 8.0 g/dL    Albumin 4.1 3.5 - 5.0 g/dL    Alkaline Phosphatase 78 45 - 120 U/L    AST 18 0 - 40 U/L    ALT 18 0 - 45 U/L    Narrative    Fasting Glucose reference range is 70-99 mg/dL per  American Diabetes Association (ADA) guidelines.   Vitamin D, Total (25-Hydroxy)   Result Value Ref Range    Vitamin D, Total (25-Hydroxy) 43.2 30.0 - 80.0 ng/mL    Narrative    Deficiency <10.0 ng/mL  Insufficiency 10.0-29.9 ng/mL  Sufficiency 30.0-80.0 ng/mL  Toxicity (possible) >100.0 ng/mL   Vitamin B12   Result Value Ref Range    Vitamin B-12 278 213 - 816 pg/mL   Erythrocyte Sedimentation Rate   Result Value Ref Range    Sed Rate 12 0 - 20 mm/hr   CK Total   Result Value Ref Range    CK, Total 159 30 - 190 U/L       Please call and  let her know that her labs were all fine yesterday including her vitamin levels, electrolytes, CBC.  There was nothing found that would cause her muscle cramps so hopefully with more hydration, they will continue to improve    Please call with questions or contact us using [employer].    Sincerely,        Electronically signed by Nguyen Jordan MD

## 2021-06-21 NOTE — LETTER
Letter by Santo Schumacher MD at      Author: Santo Schumacher MD Service: -- Author Type: --    Filed:  Encounter Date: 12/10/2020 Status: (Other)         November 30, 2020     Patient: Shira Omalley   YOB: 1950   Date of Visit: 11/19/20       To Whom It May Concern:    I am writing at the request of Shira Omalley regarding her cardiovascular status and difficulty walking distances secondary to shortness of breath and dizziness, and risk of falling and hurting herself.  Patient is in need of more appropriate housing than her current living situation provides and I am hoping that you will consider letting her out of her lease so that she can find living situation that is better suited for her heart condition.      If you have any questions or concerns, please don't hesitate to call.    Sincerely,        Electronically signed by Santo Schumacher MD

## 2021-06-21 NOTE — LETTER
Letter by Nguyen Jordan MD at      Author: Nguyen Jordan MD Service: -- Author Type: --    Filed:  Encounter Date: 11/18/2020 Status: (Other)         Shira Omalley  [address]  [address]  [place] [address]             November 18, 2020         Dear Ms. Omalley,    Below are the results from your recent visit:    Resulted Orders   HM2(CBC w/o Differential)   Result Value Ref Range    WBC 8.1 4.0 - 11.0 thou/uL    RBC 4.46 3.80 - 5.40 mill/uL    Hemoglobin 13.9 12.0 - 16.0 g/dL    Hematocrit 42.9 35.0 - 47.0 %    MCV 96 80 - 100 fL    MCH 31.1 27.0 - 34.0 pg    MCHC 32.4 32.0 - 36.0 g/dL    RDW 11.2 11.0 - 14.5 %    Platelets 297 140 - 440 thou/uL    MPV 8.7 7.0 - 10.0 fL   Lipid Cascade   Result Value Ref Range    Cholesterol 158 <=199 mg/dL    Triglycerides 90 <=149 mg/dL    HDL Cholesterol 45 (L) >=50 mg/dL    LDL Calculated 95 <=129 mg/dL    Patient Fasting > 8hrs? Yes    Thyroid Cascade   Result Value Ref Range    TSH 0.95 0.30 - 5.00 uIU/mL   Comprehensive Metabolic Panel   Result Value Ref Range    Sodium 143 136 - 145 mmol/L    Potassium 3.7 3.5 - 5.0 mmol/L    Chloride 106 98 - 107 mmol/L    CO2 24 22 - 31 mmol/L    Anion Gap, Calculation 13 5 - 18 mmol/L    Glucose 109 70 - 125 mg/dL    BUN 16 8 - 28 mg/dL    Creatinine 1.38 (H) 0.60 - 1.10 mg/dL    GFR MDRD Af Amer 46 (L) >60 mL/min/1.73m2    GFR MDRD Non Af Amer 38 (L) >60 mL/min/1.73m2    Bilirubin, Total 0.6 0.0 - 1.0 mg/dL    Calcium 10.2 8.5 - 10.5 mg/dL    Protein, Total 7.0 6.0 - 8.0 g/dL    Albumin 4.3 3.5 - 5.0 g/dL    Alkaline Phosphatase 91 45 - 120 U/L    AST 16 0 - 40 U/L    ALT 12 0 - 45 U/L    Narrative    Fasting Glucose reference range is 70-99 mg/dL per  American Diabetes Association (ADA) guidelines.   Vitamin D, Total (25-Hydroxy)   Result Value Ref Range    Vitamin D, Total (25-Hydroxy) 69.8 30.0 - 80.0 ng/mL    Narrative    Deficiency <10.0 ng/mL  Insufficiency 10.0-29.9 ng/mL  Sufficiency 30.0-80.0  ng/mL  Toxicity (possible) >100.0 ng/mL   Vitamin B12   Result Value Ref Range    Vitamin B-12 347 213 - 816 pg/mL   Hepatitis C Antibody (Anti-HCV)   Result Value Ref Range    Hepatitis C Ab Negative Negative       Hi Shira.  Your labs were all satisfactory as noted above.  Please let me know if you have any questions.  [facility]        Electronically signed by Nguyen Jordan MD

## 2021-06-28 NOTE — PROGRESS NOTES
Progress Notes by Santo Schumacher MD at 11/15/2019  7:50 AM     Author: Santo Schumacher MD Service: -- Author Type: Physician    Filed: 11/15/2019  8:25 AM Encounter Date: 11/15/2019 Status: Signed    : Santo Schumacher MD (Physician)             [facility] Progress Note    Assessment:  1.  Coronary artery disease with no clear-cut symptoms of angina.  Recent nuclear stress test was negative for ischemia.  I have suggested that she could discontinue Plavix and increase the aspirin to the 162 mg daily.  Recent reinitiation of statins.  She apparently had been off statin for uncertain reasons when she was seen in [place].  Recently her primary care physician cut the dose down to 10 mg daily.  We will need to monitor her lipids.  I suspect that muscle cramping is a more chronic issue.  Look forward to additional discussion with her primary care provider.    2.  Hypertension.  Blood pressures have been variable.  Blood pressure here in the office actually looks quite good with only a small drop with standing.  He does report periods where her blood pressure is higher and she was seen in the emergency room recently.  I suspect that there is a significant component of anxiety related to her periods of higher blood pressure.  She will be seeing her primary care physician on Monday and will look forward to discussion regarding whether there would be some options to help with anxiety management.  In addition I suspect that she does have significant sleep disorder and will be having a sleep study in the near future.    3.  History of epilepsy.  Patient recently started on Keppra with recommendations to follow-up with neurology.  She does not have an appointment with neurology.  Might be beneficial for her to see a neurologist who has an expertise in autonomic dysfunction.  I have made a recommendation.  Name of the physician is Dr. Lindsay ?  [facility].      Plan: We talked about ways to  monitor her blood pressure.  I asked that her nursing staff for blood pressure readings to us with pressures both sitting and standing after resting for 10 minutes.  I elected not to make a change in her blood pressure medicine in part related to her blood pressure readings here in the office today blood pressure results when she saw her primary care physician October 25, 2019.  She is going to wear a Holter monitor and we will asked that she return to see my nurse practitioner in 2 weeks.    1. Dizziness  Holter Monitor   2. Coronary artery disease involving native coronary artery of native heart with angina pectoris (H)     3. Dyslipidemia, goal LDL below 70     4. Essential hypertension           An After Visit Summary was printed and given to the patient.    Subjective:    Shira Omalley is a 69 y.o. female who returned for a planned  follow up visit.  We reviewed the recent hospital notes including recent ER visit.  She has a significant underlying anxiety component and therefore it is difficult to fully discern her symptoms.  It does sound as if she is feeling better overall.  She has been concerned about her blood pressure periodically spiking.  Blood pressure here in the office today looks quite reasonable as it did when she saw her primary physician recently.  I suspect that she gets anxious and then checks her blood pressure and it spikes in part related to anxiety.  In addition she states she does not sleep but does have a sleep study arranged in the near future.    Is having no clear-cut symptoms of angina.  She does report that her heart feels like it pounds periodically.  And I suspect that there is a significant component of anxiety.  He was started on Keppra when she was in the hospital.    Review of Systems:   General: WNL  Eyes: Visual Distubance  Ears/Nose/Throat: Hearing Loss  Lungs: Cough, Shortness of Breath  Heart: Arm Pain, Irregular Heartbeat  Stomach: Nausea  Bladder: Frequent  Urination at Night  Muscle/Joints: WNL  Skin: WNL  Nervous System: Dizziness, Loss of Balance  Mental Health: Depression     Blood: Easy Bruising      Problem List:    Patient Active Problem List   Diagnosis   ? Coronary artery disease involving native coronary artery of native heart with angina pectoris (H)   ? Dyslipidemia, goal LDL below 70   ? Hyperparathyroidism (H)   ? Sleep apnea   ? Essential hypertension   ? History of colonic polyps   ? Orthostatic hypotension   ? Nonintractable epilepsy without status epilepticus, unspecified epilepsy type (H)   ? Vitamin D deficiency   ? Vitamin B12 deficiency (non anemic)       Social History     Socioeconomic History   ? Marital status:      Spouse name: Not on file   ? Number of children: 2   ? Years of education: Not on file   ? Highest education level: Not on file   Occupational History   ? Occupation: reitred   Social Needs   ? Financial resource strain: Not on file   ? Food insecurity:     Worry: Not on file     Inability: Not on file   ? Transportation needs:     Medical: Not on file     Non-medical: Not on file   Tobacco Use   ? Smoking status: Former Smoker     Packs/day: 0.00   ? Smokeless tobacco: Never Used   Substance and Sexual Activity   ? Alcohol use: Never     Frequency: Never   ? Drug use: Never   ? Sexual activity: Not Currently   Lifestyle   ? Physical activity:     Days per week: Not on file     Minutes per session: Not on file   ? Stress: Not on file   Relationships   ? Social connections:     Talks on phone: Not on file     Gets together: Not on file     Attends [employer] service: Not on file     Active member of club or organization: Not on file     Attends meetings of clubs or organizations: Not on file     Relationship status: Not on file   ? Intimate partner violence:     Fear of current or ex partner: Not on file     Emotionally abused: Not on file     Physically abused: Not on file     Forced sexual activity: Not on file   Other  "Topics Concern   ? Not on file   Social History Narrative   ? Not on file       Family History   Problem Relation Age of Onset   ? Heart attack Mother    ? Heart attack Father    ? Heart attack Brother    ? Heart attack Other        Current Outpatient Medications   Medication Sig Dispense Refill   ? amLODIPine (NORVASC) 10 MG tablet Take 1 tablet (10 mg total) by mouth daily. 90 tablet 3   ? aspirin 81 MG EC tablet Take 1 tablet (81 mg total) by mouth daily. 90 tablet 1   ? atorvastatin (LIPITOR) 10 MG tablet Take 1 tablet (10 mg total) by mouth at bedtime. 90 tablet 3   ? clopidogrel (PLAVIX) 75 mg tablet Take 1 tablet (75 mg total) by mouth daily. 90 tablet 1   ? levETIRAcetam (KEPPRA) 500 MG tablet Take 1 tablet (500 mg total) by mouth 2 (two) times a day. 180 tablet 3   ? traZODone (DESYREL) 50 MG tablet Take 25 mg by mouth at bedtime as needed.     ? albuterol (PROAIR HFA;PROVENTIL HFA;VENTOLIN HFA) 90 mcg/actuation inhaler Inhale 2 puffs every 6 (six) hours as needed for wheezing or shortness of breath. 8.5 g 3   ? cholecalciferol, vitamin D3, (VITAMIN D3) 1,000 unit capsule Take 1 capsule (1,000 Units total) by mouth daily. 90 capsule 3   ? cyanocobalamin 1000 MCG tablet Take 1 tablet (1,000 mcg total) by mouth daily. 90 tablet 3   ? nitroglycerin (NITROSTAT) 0.4 MG SL tablet Place 1 tablet (0.4 mg total) under the tongue every 5 (five) minutes as needed. 50 tablet 2     No current facility-administered medications for this visit.        Objective:     /76 (Patient Site: Left Arm, Patient Position: Sitting, Cuff Size: Adult Regular)   Pulse 92   Resp 20   Ht 5' 4.25\" (1.632 m)   Wt 182 lb (82.6 kg)   BMI 31.00 kg/m    182 lb (82.6 kg)   [unfilled]  144/70 sitting  132/76 standing  Wt Readings from Last 3 Encounters:   11/15/19 182 lb (82.6 kg)   11/05/19 178 lb (80.7 kg)   10/25/19 182 lb (82.6 kg)       Physical Exam:    GENERAL APPEARANCE: alert, no apparent distress  HEENT: no scleral " icterus or xanthelasma  NECK: jugular venous pressure within normal limits  CHEST: symmetric, the lungs are clear to auscultation  CARDIOVASCULAR: regular rhythm without murmur,S4; no carotid bruits  Abdomen: No Organomegaly, masses, bruits, or tenderness. Bowels sounds are present      EXTREMITIES: no cyanosis, clubbing or edema    Cardiac Testing:  Echo Complete   Order# [id number]   Reading physician: Julio Cesar Curtis MD Ordering physician: Lily Alanis MD Study date: 10/2/19   Performing Date Performing Department   Oct 2, 2019  CARDIAC TESTING [[id number]]   Patient Information     Patient Name  Shira Omalley MRN  [id number] Sex  Female  1  1950 (68 y.o.)   Indications     Syncope   Summary       Left Ventricle: Normal left ventricular size and systolic function.False tendon in the apex of the left ventricle of no clinical significance. The calculated left ventricular ejection fraction is 69%. This represents a normal ejection fraction. Mild concentric hypertrophy noted. Normal left ventricle diastolic function. E/e' 8 to 15, which is equivocal for estimating LV filling pressures    The left ventricular wall motion is normal.    Normal right ventricular size and systolic function. TAPSE is normal, which is consistent with normal right ventricular systolic function.    Aortic Valve: Probable trileaflet aortic valve although difficult to see individual leaflets of the aortic valve There is mild focal calcification of the aortic valve present. No aortic stenosis. Moderate aortic regurgitation.    No previous study for comparison.             NM Pharmacologic Stress Test   Order# [id number]   Reading physician: Paloma Devi MD Ordering physician: Lucina Lynn MD Study date: 10/4/19   Supervising physician: SHYANN HILL   Patient Information     Patient Name  Shira Omalley MRN  [id number] Sex  Female  1  1950 (68 y.o.)   EKG Scan       Stress Test Data - Scan  on 10/4/19 8:52 AM by    Conclusion       1.Negative pharmacological regadenoson ECG for ischemia.    2.The pharmacologic nuclear stress test is negative for inducible myocardial ischemia or infarction.    3.The left ventricular ejection fraction is 59%.    4.There is no prior study available.    5.The findings of this examination were communicated to the nursing staff at  and Dr. Lynn.     MUSE DIAGNOSIS   ECG 12 lead nursing unit performed   Collected: 11/05/19 1155   Resulting lab: HE MUSE   Value: Normal sinus rhythm   Left ventricular hypertrophy with repolarization abnormality   Cannot rule out Septal infarct , age undetermined   Abnormal ECG   When compared with ECG of 02-OCT-2019 13:25,   No significant change was found   Confirmed by LIZ CALVO, COLBY LOC: ([id number]) on 11/5/2019 3:05:20 PM       Lab Results:    Lab Results   Component Value Date     10/17/2019    K 4.4 10/17/2019     (H) 10/17/2019    CO2 24 10/17/2019    BUN 13 10/17/2019    CREATININE 1.01 10/17/2019    CALCIUM 9.6 10/17/2019     Lab Results   Component Value Date    CHOL 155 10/17/2019    TRIG 77 10/17/2019    HDL 52 10/17/2019     No results found for: BNP  Creatinine (mg/dL)   Date Value   10/17/2019 1.01   10/03/2019 1.21 (H)   12/31/2018 1.06   10/05/2016 1.08     LDL Calculated (mg/dL)   Date Value   10/17/2019 88   10/03/2019 113     Lab Results   Component Value Date    WBC 8.3 10/17/2019    HGB 13.3 10/17/2019    HCT 41.1 10/17/2019    MCV 99 10/17/2019     10/17/2019         This note has been dictated using voice recognition software. Any grammatical or context distortions are unintentional and inherent to the software.

## 2021-06-29 NOTE — PROGRESS NOTES
"Progress Notes by Santo Schumacher MD at 4/28/2020  3:30 PM     Author: Santo Schumacher MD Service: -- Author Type: Physician    Filed: 11/19/2020 12:33 PM Encounter Date: 4/28/2020 Status: Addendum    : Santo Schumacher MD (Physician)    Related Notes: Original Note by Santo Schumacher MD (Physician) filed at 4/28/2020  3:50 PM           The patient has been notified of following:     \"This telephone visit will be conducted via a call between you and your physician/provider. We have found that certain health care needs can be provided without the need for a physical exam.  This service lets us provide the care you need with a phone conversation.  If a prescription is necessary we can send it directly to your pharmacy.  If lab work is needed we can place an order for that and you can then stop by our lab to have the test done at a later time. If during the course of the call the physician/provider feels a telephone visit is not appropriate, you will not be charged for this service.\" Verbal consent has been obtained for this service by care team member:         HEART CARE PHONE ENCOUNTER        The patient has chosen to have the visit conducted as a telephone visit, to reduce risk of exposure given the current status of Coronavirus in our community. This telephone visit is being conducted via a call between the patient and physician/provider. Health care needs are being provided without a physical exam.     Assessment/Recommendations   Assessment:    1.  History of coronary artery disease.  She does not report significant anginal symptoms and occasional chest discomfort that is associated with increased stress or anxiety.  She is asked to monitor for any increasing symptoms as given my nurse's telephone number.  We will plan to follow-up in 4 to 5 months or sooner if specific issues were to arise.  Nuclear stress in October was negative for ischemia.  2.  History of orthostatic lightheadedness and possible " vertigo.  Difficult history to fully define.  She is reporting to me that blood pressures been controlled with medications and monitor her blood pressure.  There is some uncertainty or some confusion about what she has been told by the sleep clinic and I did forward a note to her primary care provider in this regard.    3.  Risk factor modification.  LDL cholesterol October 2019 was 88 with an ideal LDL goal closer to 70.  She currently is on 10 mg atorvastatin I did not make any changes with telephone as I thought she was fairly confused about her medications and other medical issues.  Again look forward to further correspondence with her primary care provider.  Plan:  1.  Follow-up in 4 to 5 months.      Follow Up Plan: Follow up in 4 to 5 months.  I have reviewed the note as documented.  This accurately captures the substance of my conversation with the patient.    Total time of call between patient and provider was 18 minutes   Start Time:316   Stop Time:334       History of Present Illness/Subjective    Shira Omalley is a 69 y.o. female who is being evaluated via a billable telephone visit.    Unfortunately the conversation was difficult to track.  She did tell me that her symptoms of chest discomfort have been well controlled with infrequent use of nitroglycerin.  She states that she might use an occasional ntg if she gets upset and feels chest tightness but this is an infrequent occurrence.  She also tells me that her blood pressures been under good control.  Conversation did get confusing however around recent follow-up related to sleep apnea.  She states that she has been in touch with the sleep clinic from health [place] and indicates that she was told that she could only sleep during the day and not at night.  Unfortunately she seems quite confused on this subject.  In addition she was reporting vertigo and has been dealing with the vertigo clinic but states that she has not been following up with  them recently because of the virus.  She is encouraged to be in touch with this clinic for progressive vertigo type symptoms.  I have in the past given her name of a neurologist named  who deals with autonomic dysfunction.  She tells me that she needed to reschedule the appointment but again it was difficult to track the issues related to this consultation request.  The noted I could find from the sleep apnea clinic indicates mild obstructive sleep apnea.  The indicated that she was having difficulty putting the mask on correctly.  There is a note correspondence again in the outside records from April 9, 2020 where there was a decreased use of PA P.  They indicated that she needs a better fitting mask.  She seemed quite confused about these recommendations and I have forwarded a note to her primary care provider Dr. Jordan.    As outlined in my previous history of coronary arteries treated at an outside clinic for a number of years.  When we met I recommend discontinuation of the Plavix and to continue with aspirin.  She has a history of labile blood pressure and a history of epilepsy.  We pursued a nuclear stress test which was negative for ischemia as outlined below and no significant rhythm disturbances on Holter monitor.  She was admitted to the hospital for dizziness in October 2020.  Work-up while in the hospital suggested orthostatic hypotension.  She was also started on Keppra for known seizures but is not clear to me if she is following up with neurology.    Result Date: 10/4/2019    1.Negative pharmacological regadenoson ECG for ischemia.   2.The pharmacologic nuclear stress test is negative for inducible myocardial ischemia or infarction.   3.The left ventricular ejection fraction is 59%.   4.There is no prior study available.   5.The findings of this examination were communicated to the nursing staff at [facility] and Dr. Lynn.       ECHO:    Left Ventricle: Normal left ventricular  size and systolic function.False tendon in the apex of the left ventricle of no clinical significance. The calculated left ventricular ejection fraction is 69%. This represents a normal ejection fraction. Mild concentric hypertrophy noted. Normal left ventricle diastolic function. E/e' 8 to 15, which is equivocal for estimating LV filling pressures    The left ventricular wall motion is normal.    Normal right ventricular size and systolic function. TAPSE is normal, which is consistent with normal right ventricular systolic function.    Aortic Valve: Probable trileaflet aortic valve although difficult to see individual leaflets of the aortic valve There is mild focal calcification of the aortic valve present. No aortic stenosis. Moderate aortic regurgitation.    No previous study for comparison.     EEG:  Impression: This is an abnormal EEG due to left hemispheric slowing in theta range that suggests nonspecific dysfunction in that area.  Although nonspecific, such focal slowing could suggest underlying convulsive potential.  Clinical correlation is recommended     Classification: Dysrhythmia grade II left  I have reviewed and updated the patient's Past Medical History, Social History, Family History and Medication List.     Physical Examination not performed given phone encounter Review of Systems                                                Medical History  Surgical History Family History Social History   Past Medical History:   Diagnosis Date   ? CAD (coronary artery disease)     2 stents placed - 2011   ? Cancer, uterine (H) 7/22/2019   ? Chronic kidney disease, stage 3 (moderate) (H) 7/22/2019   ? HX: breast cancer 7/22/2019   ? Hypertension    ? Nonintractable epilepsy without status epilepticus, unspecified epilepsy type (H)    ? Ovarian cancer (H)    ? Personal history of other malignant neoplasm of bronchus and lung 7/22/2019   ? Seizure disorder (H) 7/22/2019   ? Syncope and collapse 10/2/2019    Past  Surgical History:   Procedure Laterality Date   ? HYSTERECTOMY          ? LUNG LOBECTOMY     ? MASTECTOMY Bilateral    ? MASTECTOMY MODIFIED RADICAL Bilateral    ? PARATHYROID GLAND SURGERY     ? PARTIAL NEPHRECTOMY      Family History   Problem Relation Age of Onset   ? Heart attack Mother    ? Heart attack Father    ? Heart attack Brother    ? Heart attack Other     Social History     Socioeconomic History   ? Marital status:      Spouse name: Not on file   ? Number of children: 2   ? Years of education: Not on file   ? Highest education level: Not on file   Occupational History   ? Occupation: reitred   Social Needs   ? Financial resource strain: Not on file   ? Food insecurity     Worry: Not on file     Inability: Not on file   ? Transportation needs     Medical: Not on file     Non-medical: Not on file   Tobacco Use   ? Smoking status: Former Smoker     Packs/day: 0.00   ? Smokeless tobacco: Never Used   Substance and Sexual Activity   ? Alcohol use: Never     Frequency: Never   ? Drug use: Never   ? Sexual activity: Not Currently   Lifestyle   ? Physical activity     Days per week: Not on file     Minutes per session: Not on file   ? Stress: Not on file   Relationships   ? Social connections     Talks on phone: Not on file     Gets together: Not on file     Attends Islam service: Not on file     Active member of club or organization: Not on file     Attends meetings of clubs or organizations: Not on file     Relationship status: Not on file   ? Intimate partner violence     Fear of current or ex partner: Not on file     Emotionally abused: Not on file     Physically abused: Not on file     Forced sexual activity: Not on file   Other Topics Concern   ? Not on file   Social History Narrative   ? Not on file          Medications  Allergies   Current Outpatient Medications   Medication Sig Dispense Refill   ? albuterol (PROAIR HFA;PROVENTIL HFA;VENTOLIN HFA) 90 mcg/actuation inhaler Inhale 2 puffs  every 6 (six) hours as needed for wheezing or shortness of breath. 8.5 g 3   ? amLODIPine (NORVASC) 10 MG tablet Take 1 tablet (10 mg total) by mouth daily. 90 tablet 3   ? aspirin 81 MG EC tablet Take 1 tablet (81 mg total) by mouth daily. 90 tablet 1   ? atorvastatin (LIPITOR) 10 MG tablet Take 1 tablet (10 mg total) by mouth at bedtime. 90 tablet 3   ? cholecalciferol, vitamin D3, (VITAMIN D3) 1,000 unit capsule Take 1 capsule (1,000 Units total) by mouth daily. 90 capsule 3   ? cyanocobalamin 1000 MCG tablet Take 1 tablet (1,000 mcg total) by mouth daily. 90 tablet 3   ? levETIRAcetam (KEPPRA) 500 MG tablet Take 1 tablet (500 mg total) by mouth 2 (two) times a day. 180 tablet 3   ? nitroglycerin (NITROSTAT) 0.4 MG SL tablet Place 1 tablet (0.4 mg total) under the tongue every 5 (five) minutes as needed. 50 tablet 2     No current facility-administered medications for this visit.     Allergies   Allergen Reactions   ? Contrast [Iohexol] Itching   ? Iodinated Casein    ? Iodinated Contrast Media Itching     Premedicated prior to CT scans and has no problems   ? Penicillins          Lab Results    Chemistry/lipid CBC Cardiac Enzymes/BNP/TSH/INR   Lab Results   Component Value Date    CHOL 155 10/17/2019    HDL 52 10/17/2019    LDLCALC 88 10/17/2019    TRIG 77 10/17/2019    CREATININE 1.42 (H) 02/06/2020    BUN 24 02/06/2020    K 3.8 02/06/2020     02/06/2020     02/06/2020    CO2 23 02/06/2020    Lab Results   Component Value Date    WBC 9.5 02/06/2020    HGB 13.9 02/06/2020    HCT 40.9 02/06/2020    MCV 97 02/06/2020     02/06/2020    Lab Results   Component Value Date    CKTOTAL 159 02/06/2020    TSH 1.00 10/17/2019        Santo Schumacher

## 2021-06-30 NOTE — PROGRESS NOTES
Progress Notes by Santo Schumacher MD at 11/19/2020  3:10 PM     Author: Santo Schumacher MD Service: -- Author Type: Physician    Filed: 11/19/2020  4:44 PM Encounter Date: 11/19/2020 Status: Signed    : Santo Schumacher MD (Physician)             [facility] Progress Note    Assessment:  1.  History of coronary artery disease.  No clear-cut symptoms of angina.  I did tell her that she needed to be cautious with respect utilizing nitroglycerin given her history of apparent orthostatic hypotension.  She is instructed that if she has to use nitroglycerin she should stay seated or lying.  She had a coronary stent placed a number [place] to the [place].  She had a nuclear stress test that was negative in October 2019 and an echocardiogram that demonstrated no significant findings.    2.  History of orthostatic lightheadedness and possibly vertigo.  This history has been very difficult to discern.  She tells me she has been seen in the vertigo clinic but has not had any improvement in her symptoms.  I had recommended a neurologist who has an interest in autonomic dysfunction and Dr. Prince [facility].  She tells me that she did have a visit with him although the details are again a little bit uncertain.  I encouraged her to consider making a new appointment.  Chart records indicate that she is had a longstanding history of orthostatic type dizziness.  This was reviewed from [facility] from [facility] as well.  Holter monitor November 2019 did not show any significant rhythm issues.  3.  Risk factor modification.  I reviewed recent laboratory studies and recommended that she increase the atorvastatin from 10 mg to 20 mg.  I asked her to monitor for any musculoskeletal type symptoms and would recommend a repeat cholesterol  and liver panel which I scheduled in 2 months.  LDL cholesterol  95 with an ideal LDL goal less than 70.        Plan: As  outlined above with plan follow-up in 6 months.    1. Dyslipidemia, goal LDL below 70     2. Coronary artery disease involving native coronary artery of native heart with angina pectoris (H)  atorvastatin (LIPITOR) 20 MG tablet    Lipid Profile    AST    ALT   3. Essential hypertension     4. Orthostatic hypotension           An After Visit Summary was printed and given to the patient.    Subjective:    Shira Omalley is a 70 y.o. female who returned for a planned  follow up visit.  She is accompanied by her granddaughter.  She is not reporting any significant anginal symptoms although history has been challenging.  She relates ongoing intermittent falling episodes.  She gives an example of falling a few days ago where she fell onto her face.  She states that if she walks too far she sees black circles or stars in her eyes.  I suspect a component of orthostatic hypotension or autonomic dysfunction however have not been witnessed.  Unfortunately her living situation is challenging and that she lives up 3 flights of stairs and has to go a long distance to take the garbage out.  The granddaughter is wondering about a different living situation.  I certainly encourage this consideration.  I have reviewed recent notes from her primary care provider.    Review of Systems:   General: WNL  Eyes: WNL  Ears/Nose/Throat: WNL  Lungs: WNL  Heart: WNL  Stomach: WNL  Bladder: WNL  Muscle/Joints: WNL  Skin: WNL  Nervous System: WNL  Mental Health: WNL     Blood: WNL      Problem List:    Patient Active Problem List   Diagnosis   ? Coronary artery disease involving native coronary artery of native heart with angina pectoris (H)   ? Dyslipidemia, goal LDL below 70   ? Hyperparathyroidism (H)   ? Obstructive sleep apnea syndrome   ? Essential hypertension   ? History of colonic polyps   ? Orthostatic hypotension   ? Nonintractable epilepsy without status epilepticus, unspecified epilepsy type (H)   ? Vitamin D deficiency   ?  Vitamin B12 deficiency (non anemic)       Social History     Socioeconomic History   ? Marital status:      Spouse name: Not on file   ? Number of children: 2   ? Years of education: Not on file   ? Highest education level: Not on file   Occupational History   ? Occupation: reitred   Social Needs   ? Financial resource strain: Not on file   ? Food insecurity     Worry: Not on file     Inability: Not on file   ? Transportation needs     Medical: Not on file     Non-medical: Not on file   Tobacco Use   ? Smoking status: Former Smoker     Packs/day: 0.00   ? Smokeless tobacco: Never Used   Substance and Sexual Activity   ? Alcohol use: Never     Frequency: Never   ? Drug use: Never   ? Sexual activity: Not Currently   Lifestyle   ? Physical activity     Days per week: Not on file     Minutes per session: Not on file   ? Stress: Not on file   Relationships   ? Social connections     Talks on phone: Not on file     Gets together: Not on file     Attends Muslim service: Not on file     Active member of club or organization: Not on file     Attends meetings of clubs or organizations: Not on file     Relationship status: Not on file   ? Intimate partner violence     Fear of current or ex partner: Not on file     Emotionally abused: Not on file     Physically abused: Not on file     Forced sexual activity: Not on file   Other Topics Concern   ? Not on file   Social History Narrative   ? Not on file       Family History   Problem Relation Age of Onset   ? Heart attack Mother    ? Heart attack Father    ? Heart attack Brother    ? Heart attack Other        Current Outpatient Medications   Medication Sig Dispense Refill   ? amLODIPine (NORVASC) 10 MG tablet Take 1 tablet (10 mg total) by mouth daily. 90 tablet 3   ? aspirin 81 MG EC tablet Take 1 tablet (81 mg total) by mouth daily. 90 tablet 1   ? atorvastatin (LIPITOR) 20 MG tablet Take 1 tablet (20 mg total) by mouth at bedtime. 90 tablet 3   ? cholecalciferol,  vitamin D3, (VITAMIN D3) 1,000 unit capsule Take 1 capsule (1,000 Units total) by mouth daily. 90 capsule 3   ? cyanocobalamin 1000 MCG tablet Take 1 tablet (1,000 mcg total) by mouth daily. 90 tablet 3   ? levETIRAcetam (KEPPRA) 500 MG tablet Take 1 tablet (500 mg total) by mouth 2 (two) times a day. 180 tablet 3   ? nitroglycerin (NITROSTAT) 0.4 MG SL tablet Place 1 tablet (0.4 mg total) under the tongue every 5 (five) minutes as needed. 50 tablet 2     No current facility-administered medications for this visit.        Objective:     /70 (Patient Site: Left Arm, Patient Position: Sitting, Cuff Size: Adult Regular)   Pulse 80   Resp 14   Wt 164 lb 8 oz (74.6 kg)   SpO2 97%   BMI 30.09 kg/m    164 lb 8 oz (74.6 kg)   [unfilled]  Wt Readings from Last 3 Encounters:   20 164 lb 8 oz (74.6 kg)   20 162 lb (73.5 kg)   10/08/20 172 lb (78 kg)       Physical Exam:    GENERAL APPEARANCE: alert, no apparent distress  HEENT: no scleral icterus or xanthelasma  NECK: jugular venous pressure not obviously elevated examined in the wheelchair.  CHEST: symmetric, the lungs are clear to auscultation  CARDIOVASCULAR: regular rhythm without murmur, click, or gallop; no carotid bruits  Abdomen: No Organomegaly, masses, bruits, or tenderness. Bowels sounds are present      EXTREMITIES: no cyanosis, clubbing or edema    Cardiac Testing:  Echo Complete  Order# [id number]  Reading physician: Julio Cesar Curtis MD Ordering physician: Lily Alanis MD Study date: 10/2/19   Performing Date Performing Department   Oct 2, 2019  CARDIAC TESTING [[id number]]   Patient Information    Patient Name   Shira Omalley MRN   [id number] Sex   Female  1   1950 (68 y.o.)   Indications    Syncope   Summary      Left Ventricle: Normal left ventricular size and systolic function.False tendon in the apex of the left ventricle of no clinical significance. The calculated left ventricular ejection fraction is 69%.  This represents a normal ejection fraction. Mild concentric hypertrophy noted. Normal left ventricle diastolic function. E/e' 8 to 15, which is equivocal for estimating LV filling pressures    The left ventricular wall motion is normal.    Normal right ventricular size and systolic function. TAPSE is normal, which is consistent with normal right ventricular systolic function.    Aortic Valve: Probable trileaflet aortic valve although difficult to see individual leaflets of the aortic valve There is mild focal calcification of the aortic valve present. No aortic stenosis. Moderate aortic regurgitation.    No previous study for comparison.            Lab Results:    Lab Results   Component Value Date     11/17/2020    K 3.7 11/17/2020     11/17/2020    CO2 24 11/17/2020    BUN 16 11/17/2020    CREATININE 1.38 (H) 11/17/2020    CALCIUM 10.2 11/17/2020     Lab Results   Component Value Date    CHOL 158 11/17/2020    TRIG 90 11/17/2020    HDL 45 (L) 11/17/2020     No results found for: BNP  Creatinine (mg/dL)   Date Value   11/17/2020 1.38 (H)   02/06/2020 1.42 (H)   10/17/2019 1.01   10/03/2019 1.21 (H)     LDL Calculated (mg/dL)   Date Value   11/17/2020 95   10/17/2019 88   10/03/2019 113     Lab Results   Component Value Date    WBC 8.1 11/17/2020    HGB 13.9 11/17/2020    HCT 42.9 11/17/2020    MCV 96 11/17/2020     11/17/2020         This note has been dictated using voice recognition software. Any grammatical or context distortions are unintentional and inherent to the software.

## 2021-07-14 PROBLEM — N18.30 STAGE 3 CHRONIC KIDNEY DISEASE (H): Status: RESOLVED | Noted: 2019-07-22 | Resolved: 2019-10-25

## 2021-08-03 PROBLEM — C55 CANCER, UTERINE (H): Status: RESOLVED | Noted: 2019-07-22 | Resolved: 2019-10-25

## 2021-08-03 PROBLEM — G40.909 SEIZURE DISORDER (H): Status: RESOLVED | Noted: 2019-07-22 | Resolved: 2019-10-25

## 2021-12-07 ENCOUNTER — DOCUMENTATION ONLY (OUTPATIENT)
Dept: OTHER | Facility: CLINIC | Age: 71
End: 2021-12-07
Payer: MEDICARE